# Patient Record
Sex: MALE | Race: BLACK OR AFRICAN AMERICAN | NOT HISPANIC OR LATINO | Employment: OTHER | URBAN - METROPOLITAN AREA
[De-identification: names, ages, dates, MRNs, and addresses within clinical notes are randomized per-mention and may not be internally consistent; named-entity substitution may affect disease eponyms.]

---

## 2018-12-06 ENCOUNTER — HISTORICAL (OUTPATIENT)
Dept: ADMINISTRATIVE | Facility: HOSPITAL | Age: 78
End: 2018-12-06

## 2018-12-12 LAB
FINAL CULTURE: NORMAL
FINAL CULTURE: NORMAL

## 2022-12-16 ENCOUNTER — LAB REQUISITION (OUTPATIENT)
Dept: LAB | Facility: HOSPITAL | Age: 82
End: 2022-12-16
Payer: MEDICARE

## 2022-12-16 DIAGNOSIS — E03.9 HYPOTHYROIDISM, UNSPECIFIED: ICD-10-CM

## 2022-12-16 DIAGNOSIS — R79.9 ABNORMAL FINDING OF BLOOD CHEMISTRY, UNSPECIFIED: ICD-10-CM

## 2022-12-16 LAB
ALBUMIN SERPL-MCNC: 2.9 G/DL (ref 3.4–4.8)
ALBUMIN/GLOB SERPL: 0.7 RATIO (ref 1.1–2)
ALP SERPL-CCNC: 73 UNIT/L (ref 40–150)
ALT SERPL-CCNC: 30 UNIT/L (ref 0–55)
AST SERPL-CCNC: 29 UNIT/L (ref 5–34)
BASOPHILS # BLD AUTO: 0.03 X10(3)/MCL (ref 0–0.2)
BASOPHILS NFR BLD AUTO: 0.4 %
BILIRUBIN DIRECT+TOT PNL SERPL-MCNC: 0.7 MG/DL
BUN SERPL-MCNC: 48.3 MG/DL (ref 8.4–25.7)
CALCIUM SERPL-MCNC: 10.4 MG/DL (ref 8.8–10)
CHLORIDE SERPL-SCNC: 105 MMOL/L (ref 98–107)
CHOLEST SERPL-MCNC: 156 MG/DL
CHOLEST/HDLC SERPL: 3 {RATIO} (ref 0–5)
CO2 SERPL-SCNC: 29 MMOL/L (ref 23–31)
CREAT SERPL-MCNC: 1.98 MG/DL (ref 0.73–1.18)
DEPRECATED CALCIDIOL+CALCIFEROL SERPL-MC: 38.7 NG/ML (ref 30–80)
EOSINOPHIL # BLD AUTO: 0.06 X10(3)/MCL (ref 0–0.9)
EOSINOPHIL NFR BLD AUTO: 0.8 %
ERYTHROCYTE [DISTWIDTH] IN BLOOD BY AUTOMATED COUNT: 15.8 % (ref 11.6–14.4)
EST. AVERAGE GLUCOSE BLD GHB EST-MCNC: 102.5 MG/DL
GFR SERPLBLD CREATININE-BSD FMLA CKD-EPI: 33 MLS/MIN/1.73/M2
GLOBULIN SER-MCNC: 4.4 GM/DL (ref 2.4–3.5)
GLUCOSE SERPL-MCNC: 124 MG/DL (ref 82–115)
HBA1C MFR BLD: 5.2 %
HCT VFR BLD AUTO: 37.5 % (ref 42–52)
HDLC SERPL-MCNC: 47 MG/DL (ref 35–60)
HGB BLD-MCNC: 12.1 GM/DL (ref 14–18)
IMM GRANULOCYTES # BLD AUTO: 0.03 X10(3)/MCL (ref 0–0.04)
IMM GRANULOCYTES NFR BLD AUTO: 0.4 %
LDLC SERPL CALC-MCNC: 97 MG/DL (ref 50–140)
LYMPHOCYTES # BLD AUTO: 1.43 X10(3)/MCL (ref 0.6–4.6)
LYMPHOCYTES NFR BLD AUTO: 20.2 %
MAGNESIUM SERPL-MCNC: 1.8 MG/DL (ref 1.6–2.6)
MCH RBC QN AUTO: 30.2 PG
MCHC RBC AUTO-ENTMCNC: 32.3 MG/DL (ref 33–36)
MCV RBC AUTO: 93.5 FL (ref 80–94)
MONOCYTES # BLD AUTO: 0.71 X10(3)/MCL (ref 0.1–1.3)
MONOCYTES NFR BLD AUTO: 10 %
NEUTROPHILS # BLD AUTO: 4.82 X10(3)/MCL (ref 2.1–9.2)
NEUTROPHILS NFR BLD AUTO: 68.2 %
NRBC BLD AUTO-RTO: 0 % (ref 0–1)
PLATELET # BLD AUTO: 209 X10(3)/MCL (ref 140–371)
PMV BLD AUTO: 11.3 FL (ref 9.4–12.4)
POTASSIUM SERPL-SCNC: 4.2 MMOL/L (ref 3.5–5.1)
PREALB SERPL-MCNC: 17.1 MG/DL (ref 16–42)
PROT SERPL-MCNC: 7.3 GM/DL (ref 5.8–7.6)
RBC # BLD AUTO: 4.01 X10(6)/MCL (ref 4.7–6.1)
SODIUM SERPL-SCNC: 146 MMOL/L (ref 136–145)
T4 FREE SERPL-MCNC: 1.82 NG/DL (ref 0.7–1.48)
TRIGL SERPL-MCNC: 61 MG/DL (ref 34–140)
TSH SERPL-ACNC: 0.92 UIU/ML (ref 0.35–4.94)
VLDLC SERPL CALC-MCNC: 12 MG/DL
WBC # SPEC AUTO: 7.1 X10(3)/MCL (ref 4.5–11.5)

## 2022-12-16 PROCEDURE — 85025 COMPLETE CBC W/AUTO DIFF WBC: CPT | Performed by: NURSE PRACTITIONER

## 2022-12-16 PROCEDURE — 82306 VITAMIN D 25 HYDROXY: CPT | Performed by: NURSE PRACTITIONER

## 2022-12-16 PROCEDURE — 80053 COMPREHEN METABOLIC PANEL: CPT | Performed by: NURSE PRACTITIONER

## 2022-12-16 PROCEDURE — 84134 ASSAY OF PREALBUMIN: CPT | Performed by: NURSE PRACTITIONER

## 2022-12-16 PROCEDURE — 83735 ASSAY OF MAGNESIUM: CPT | Performed by: NURSE PRACTITIONER

## 2022-12-16 PROCEDURE — 83036 HEMOGLOBIN GLYCOSYLATED A1C: CPT | Performed by: NURSE PRACTITIONER

## 2022-12-16 PROCEDURE — 84439 ASSAY OF FREE THYROXINE: CPT | Performed by: NURSE PRACTITIONER

## 2022-12-16 PROCEDURE — 84443 ASSAY THYROID STIM HORMONE: CPT | Performed by: NURSE PRACTITIONER

## 2022-12-16 PROCEDURE — 80061 LIPID PANEL: CPT | Performed by: NURSE PRACTITIONER

## 2022-12-28 ENCOUNTER — HOSPITAL ENCOUNTER (INPATIENT)
Facility: HOSPITAL | Age: 82
LOS: 8 days | Discharge: HOSPICE | DRG: 871 | End: 2023-01-05
Attending: EMERGENCY MEDICINE | Admitting: INTERNAL MEDICINE
Payer: MEDICARE

## 2022-12-28 DIAGNOSIS — J18.9 PNEUMONIA: ICD-10-CM

## 2022-12-28 DIAGNOSIS — U07.1 PNEUMONIA DUE TO COVID-19 VIRUS: Primary | ICD-10-CM

## 2022-12-28 DIAGNOSIS — N17.9 ACUTE KIDNEY INJURY SUPERIMPOSED ON CHRONIC KIDNEY DISEASE: ICD-10-CM

## 2022-12-28 DIAGNOSIS — J12.82 PNEUMONIA DUE TO COVID-19 VIRUS: Primary | ICD-10-CM

## 2022-12-28 DIAGNOSIS — I21.4 NSTEMI (NON-ST ELEVATED MYOCARDIAL INFARCTION): ICD-10-CM

## 2022-12-28 DIAGNOSIS — E13.65 OTHER SPECIFIED DIABETES MELLITUS WITH HYPERGLYCEMIA, UNSPECIFIED WHETHER LONG TERM INSULIN USE: ICD-10-CM

## 2022-12-28 DIAGNOSIS — R06.00 DYSPNEA: ICD-10-CM

## 2022-12-28 DIAGNOSIS — J96.01 ACUTE HYPOXEMIC RESPIRATORY FAILURE: ICD-10-CM

## 2022-12-28 DIAGNOSIS — R06.02 SOB (SHORTNESS OF BREATH): ICD-10-CM

## 2022-12-28 DIAGNOSIS — N18.9 ACUTE KIDNEY INJURY SUPERIMPOSED ON CHRONIC KIDNEY DISEASE: ICD-10-CM

## 2022-12-28 DIAGNOSIS — A41.9 SEPSIS, DUE TO UNSPECIFIED ORGANISM, UNSPECIFIED WHETHER ACUTE ORGAN DYSFUNCTION PRESENT: ICD-10-CM

## 2022-12-28 DIAGNOSIS — M25.473 ANKLE SWELLING: ICD-10-CM

## 2022-12-28 LAB
ABS NEUT (OLG): 19.53 X10(3)/MCL (ref 2.1–9.2)
ALBUMIN SERPL-MCNC: 2.2 G/DL (ref 3.4–4.8)
ALBUMIN/GLOB SERPL: 0.5 RATIO (ref 1.1–2)
ALP SERPL-CCNC: 134 UNIT/L (ref 40–150)
ALT SERPL-CCNC: 34 UNIT/L (ref 0–55)
AST SERPL-CCNC: 50 UNIT/L (ref 5–34)
BILIRUBIN DIRECT+TOT PNL SERPL-MCNC: 0.5 MG/DL
BNP BLD-MCNC: 213 PG/ML
BUN SERPL-MCNC: 78.3 MG/DL (ref 8.4–25.7)
BURR CELLS (OLG): ABNORMAL
CALCIUM SERPL-MCNC: 9 MG/DL (ref 8.8–10)
CHLORIDE SERPL-SCNC: 101 MMOL/L (ref 98–107)
CO2 SERPL-SCNC: 20 MMOL/L (ref 23–31)
CORRECTED TEMPERATURE (PCO2): 40 MMHG (ref 35–45)
CORRECTED TEMPERATURE (PH): 7.32 (ref 7.35–7.45)
CORRECTED TEMPERATURE (PO2): 66 MMHG (ref 80–100)
CREAT SERPL-MCNC: 2.95 MG/DL (ref 0.73–1.18)
ERYTHROCYTE [DISTWIDTH] IN BLOOD BY AUTOMATED COUNT: 15.9 % (ref 11.6–14.4)
FLUAV AG UPPER RESP QL IA.RAPID: NOT DETECTED
FLUBV AG UPPER RESP QL IA.RAPID: NOT DETECTED
GFR SERPLBLD CREATININE-BSD FMLA CKD-EPI: 21 MLS/MIN/1.73/M2
GLOBULIN SER-MCNC: 4.8 GM/DL (ref 2.4–3.5)
GLUCOSE SERPL-MCNC: 271 MG/DL (ref 82–115)
HCO3 UR-SCNC: 20.6 MMOL/L (ref 22–26)
HCT VFR BLD AUTO: 32.2 % (ref 42–52)
HGB BLD-MCNC: 10.5 G/DL (ref 12–16)
HGB BLD-MCNC: 10.7 GM/DL (ref 14–18)
IMM GRANULOCYTES # BLD AUTO: 0.18 X10(3)/MCL (ref 0–0.04)
IMM GRANULOCYTES NFR BLD AUTO: 0.8 %
INSTRUMENT WBC (OLG): 21 X10(3)/MCL
LACTATE SERPL-SCNC: 1.5 MMOL/L (ref 0.5–2.2)
LYMPHOCYTES NFR BLD MANUAL: 0.63 X10(3)/MCL
LYMPHOCYTES NFR BLD MANUAL: 3 %
MCH RBC QN AUTO: 30 PG
MCHC RBC AUTO-ENTMCNC: 33.2 MG/DL (ref 33–36)
MCV RBC AUTO: 90.2 FL (ref 80–94)
MONOCYTES NFR BLD MANUAL: 0.84 X10(3)/MCL (ref 0.1–1.3)
MONOCYTES NFR BLD MANUAL: 4 %
NEUTROPHILS NFR BLD MANUAL: 93 %
NRBC BLD AUTO-RTO: 0 % (ref 0–1)
PCO2 BLDA: 40 MMHG (ref 35–45)
PH SMN: 7.32 [PH] (ref 7.35–7.45)
PLATELET # BLD AUTO: 203 X10(3)/MCL (ref 140–371)
PLATELET # BLD EST: NORMAL 10*3/UL
PMV BLD AUTO: 9.7 FL (ref 9.4–12.4)
PO2 BLDA: 66 MMHG (ref 80–100)
POC BASE DEFICIT: -5.1 MMOL/L (ref -2–2)
POC COHB: 2.5 %
POC IONIZED CALCIUM: 1.15 MMOL/L (ref 1.12–1.23)
POC METHB: 0.6 % (ref 0.4–1.5)
POC O2HB: 92 % (ref 94–97)
POC SATURATED O2: 90.9 %
POC TEMPERATURE: 37 °C
POIKILOCYTOSIS BLD QL SMEAR: ABNORMAL
POTASSIUM BLD-SCNC: 4.3 MMOL/L (ref 3.5–5)
POTASSIUM SERPL-SCNC: 4.6 MMOL/L (ref 3.5–5.1)
PROT SERPL-MCNC: 7 GM/DL (ref 5.8–7.6)
RBC # BLD AUTO: 3.57 X10(6)/MCL (ref 4.7–6.1)
RBC MORPH BLD: ABNORMAL
SARS-COV-2 RNA RESP QL NAA+PROBE: DETECTED
SODIUM BLD-SCNC: 129 MMOL/L (ref 137–145)
SODIUM SERPL-SCNC: 133 MMOL/L (ref 136–145)
SPECIMEN SOURCE: ABNORMAL
TROPONIN I SERPL-MCNC: 0.07 NG/ML (ref 0–0.04)
WBC # SPEC AUTO: 21.3 X10(3)/MCL (ref 4.5–11.5)

## 2022-12-28 PROCEDURE — 85025 COMPLETE CBC W/AUTO DIFF WBC: CPT | Performed by: EMERGENCY MEDICINE

## 2022-12-28 PROCEDURE — 0240U COVID/FLU A&B PCR: CPT | Performed by: EMERGENCY MEDICINE

## 2022-12-28 PROCEDURE — 87040 BLOOD CULTURE FOR BACTERIA: CPT | Performed by: EMERGENCY MEDICINE

## 2022-12-28 PROCEDURE — 27000207 HC ISOLATION

## 2022-12-28 PROCEDURE — 84484 ASSAY OF TROPONIN QUANT: CPT | Performed by: EMERGENCY MEDICINE

## 2022-12-28 PROCEDURE — 85027 COMPLETE CBC AUTOMATED: CPT | Performed by: EMERGENCY MEDICINE

## 2022-12-28 PROCEDURE — 96375 TX/PRO/DX INJ NEW DRUG ADDON: CPT

## 2022-12-28 PROCEDURE — 96366 THER/PROPH/DIAG IV INF ADDON: CPT

## 2022-12-28 PROCEDURE — 11000001 HC ACUTE MED/SURG PRIVATE ROOM

## 2022-12-28 PROCEDURE — 96367 TX/PROPH/DG ADDL SEQ IV INF: CPT

## 2022-12-28 PROCEDURE — 82803 BLOOD GASES ANY COMBINATION: CPT

## 2022-12-28 PROCEDURE — 80053 COMPREHEN METABOLIC PANEL: CPT | Performed by: EMERGENCY MEDICINE

## 2022-12-28 PROCEDURE — 93005 ELECTROCARDIOGRAM TRACING: CPT

## 2022-12-28 PROCEDURE — 36600 WITHDRAWAL OF ARTERIAL BLOOD: CPT

## 2022-12-28 PROCEDURE — 99900035 HC TECH TIME PER 15 MIN (STAT)

## 2022-12-28 PROCEDURE — 83880 ASSAY OF NATRIURETIC PEPTIDE: CPT | Performed by: EMERGENCY MEDICINE

## 2022-12-28 PROCEDURE — 82962 GLUCOSE BLOOD TEST: CPT

## 2022-12-28 PROCEDURE — 83605 ASSAY OF LACTIC ACID: CPT | Performed by: EMERGENCY MEDICINE

## 2022-12-28 PROCEDURE — 93010 EKG 12-LEAD: ICD-10-PCS | Mod: ,,, | Performed by: INTERNAL MEDICINE

## 2022-12-28 PROCEDURE — 63600175 PHARM REV CODE 636 W HCPCS: Performed by: EMERGENCY MEDICINE

## 2022-12-28 PROCEDURE — 96365 THER/PROPH/DIAG IV INF INIT: CPT

## 2022-12-28 PROCEDURE — 93010 ELECTROCARDIOGRAM REPORT: CPT | Mod: ,,, | Performed by: INTERNAL MEDICINE

## 2022-12-28 PROCEDURE — 25000003 PHARM REV CODE 250: Performed by: EMERGENCY MEDICINE

## 2022-12-28 PROCEDURE — 99291 CRITICAL CARE FIRST HOUR: CPT | Mod: 25

## 2022-12-28 RX ORDER — MIRTAZAPINE 15 MG/1
TABLET, FILM COATED ORAL
Status: ON HOLD | COMMUNITY
Start: 2022-02-07 | End: 2023-01-05 | Stop reason: HOSPADM

## 2022-12-28 RX ORDER — INSULIN ASPART 100 [IU]/ML
1-10 INJECTION, SOLUTION INTRAVENOUS; SUBCUTANEOUS
Status: DISCONTINUED | OUTPATIENT
Start: 2022-12-29 | End: 2023-01-05 | Stop reason: HOSPADM

## 2022-12-28 RX ORDER — QUETIAPINE FUMARATE 25 MG/1
25 TABLET, FILM COATED ORAL DAILY
Status: DISCONTINUED | OUTPATIENT
Start: 2022-12-29 | End: 2022-12-30

## 2022-12-28 RX ORDER — TALC
6 POWDER (GRAM) TOPICAL NIGHTLY PRN
Status: DISCONTINUED | OUTPATIENT
Start: 2022-12-29 | End: 2023-01-05 | Stop reason: HOSPADM

## 2022-12-28 RX ORDER — DEXAMETHASONE SODIUM PHOSPHATE 4 MG/ML
8 INJECTION, SOLUTION INTRA-ARTICULAR; INTRALESIONAL; INTRAMUSCULAR; INTRAVENOUS; SOFT TISSUE
Status: COMPLETED | OUTPATIENT
Start: 2022-12-28 | End: 2022-12-28

## 2022-12-28 RX ORDER — TAMSULOSIN HYDROCHLORIDE 0.4 MG/1
0.4 CAPSULE ORAL DAILY
Status: ON HOLD | COMMUNITY
Start: 2022-02-07 | End: 2023-01-05 | Stop reason: HOSPADM

## 2022-12-28 RX ORDER — ZINC SULFATE 50(220)MG
220 CAPSULE ORAL DAILY
Status: ON HOLD | COMMUNITY
End: 2023-01-05 | Stop reason: HOSPADM

## 2022-12-28 RX ORDER — FINASTERIDE 5 MG/1
TABLET, FILM COATED ORAL DAILY
Status: ON HOLD | COMMUNITY
Start: 2022-02-07 | End: 2023-01-05 | Stop reason: HOSPADM

## 2022-12-28 RX ORDER — BENAZEPRIL HYDROCHLORIDE 10 MG/1
10 TABLET ORAL DAILY
Status: ON HOLD | COMMUNITY
Start: 2022-02-09 | End: 2023-01-05 | Stop reason: HOSPADM

## 2022-12-28 RX ORDER — POTASSIUM CHLORIDE 750 MG/1
20 CAPSULE, EXTENDED RELEASE ORAL ONCE
Status: ON HOLD | COMMUNITY
End: 2023-01-05 | Stop reason: HOSPADM

## 2022-12-28 RX ORDER — PIOGLITAZONEHYDROCHLORIDE 15 MG/1
15 TABLET ORAL DAILY
Status: ON HOLD | COMMUNITY
Start: 2022-12-27 | End: 2023-01-05 | Stop reason: HOSPADM

## 2022-12-28 RX ORDER — SODIUM CHLORIDE 0.9 % (FLUSH) 0.9 %
10 SYRINGE (ML) INJECTION
Status: DISCONTINUED | OUTPATIENT
Start: 2022-12-29 | End: 2023-01-05 | Stop reason: HOSPADM

## 2022-12-28 RX ORDER — FUROSEMIDE 20 MG/1
20 TABLET ORAL DAILY
Status: DISCONTINUED | OUTPATIENT
Start: 2022-12-29 | End: 2022-12-29

## 2022-12-28 RX ORDER — ENOXAPARIN SODIUM 100 MG/ML
30 INJECTION SUBCUTANEOUS EVERY 24 HOURS
Status: DISCONTINUED | OUTPATIENT
Start: 2022-12-29 | End: 2022-12-31

## 2022-12-28 RX ORDER — ASCORBIC ACID 500 MG
500 TABLET ORAL 2 TIMES DAILY
Status: ON HOLD | COMMUNITY
End: 2023-01-05 | Stop reason: HOSPADM

## 2022-12-28 RX ORDER — MIRTAZAPINE 15 MG/1
15 TABLET, FILM COATED ORAL NIGHTLY
Status: DISCONTINUED | OUTPATIENT
Start: 2022-12-29 | End: 2023-01-04

## 2022-12-28 RX ORDER — TAMSULOSIN HYDROCHLORIDE 0.4 MG/1
0.4 CAPSULE ORAL DAILY
Status: DISCONTINUED | OUTPATIENT
Start: 2022-12-29 | End: 2023-01-05 | Stop reason: HOSPADM

## 2022-12-28 RX ORDER — QUETIAPINE FUMARATE 25 MG/1
25 TABLET, FILM COATED ORAL
Status: ON HOLD | COMMUNITY
Start: 2022-12-14 | End: 2023-01-05 | Stop reason: HOSPADM

## 2022-12-28 RX ORDER — VANCOMYCIN HCL IN 5 % DEXTROSE 1G/250ML
25 PLASTIC BAG, INJECTION (ML) INTRAVENOUS
Status: COMPLETED | OUTPATIENT
Start: 2022-12-28 | End: 2022-12-28

## 2022-12-28 RX ORDER — IBUPROFEN 200 MG
24 TABLET ORAL
Status: DISCONTINUED | OUTPATIENT
Start: 2022-12-29 | End: 2023-01-05 | Stop reason: HOSPADM

## 2022-12-28 RX ORDER — AMLODIPINE BESYLATE 2.5 MG/1
2.5 TABLET ORAL DAILY
Status: ON HOLD | COMMUNITY
Start: 2022-08-15 | End: 2023-01-05 | Stop reason: HOSPADM

## 2022-12-28 RX ORDER — INSULIN ASPART 100 [IU]/ML
0-5 INJECTION, SOLUTION INTRAVENOUS; SUBCUTANEOUS
Status: DISCONTINUED | OUTPATIENT
Start: 2022-12-29 | End: 2023-01-05 | Stop reason: HOSPADM

## 2022-12-28 RX ORDER — CHOLECALCIFEROL (VITAMIN D3) 25 MCG
5000 TABLET ORAL DAILY
Status: DISCONTINUED | OUTPATIENT
Start: 2022-12-29 | End: 2023-01-05 | Stop reason: HOSPADM

## 2022-12-28 RX ORDER — DEXAMETHASONE 6 MG/1
6 TABLET ORAL NIGHTLY
Status: ON HOLD | COMMUNITY
Start: 2022-12-26 | End: 2023-01-05 | Stop reason: HOSPADM

## 2022-12-28 RX ORDER — FINASTERIDE 5 MG/1
5 TABLET, FILM COATED ORAL DAILY
Status: DISCONTINUED | OUTPATIENT
Start: 2022-12-29 | End: 2023-01-05 | Stop reason: HOSPADM

## 2022-12-28 RX ORDER — ZINC SULFATE 50(220)MG
220 CAPSULE ORAL DAILY
Status: DISPENSED | OUTPATIENT
Start: 2022-12-29 | End: 2023-01-04

## 2022-12-28 RX ORDER — ASCORBIC ACID 250 MG
500 TABLET ORAL 2 TIMES DAILY
Status: DISCONTINUED | OUTPATIENT
Start: 2022-12-29 | End: 2023-01-05 | Stop reason: HOSPADM

## 2022-12-28 RX ORDER — FUROSEMIDE 40 MG/1
TABLET ORAL
Status: ON HOLD | COMMUNITY
Start: 2022-02-07 | End: 2023-01-05 | Stop reason: HOSPADM

## 2022-12-28 RX ORDER — CHOLECALCIFEROL (VITAMIN D3) 25 MCG
5000 TABLET ORAL DAILY
Status: ON HOLD | COMMUNITY
End: 2023-01-05 | Stop reason: HOSPADM

## 2022-12-28 RX ORDER — GLUCAGON 1 MG
1 KIT INJECTION
Status: DISCONTINUED | OUTPATIENT
Start: 2022-12-29 | End: 2023-01-05 | Stop reason: HOSPADM

## 2022-12-28 RX ORDER — IBUPROFEN 200 MG
16 TABLET ORAL
Status: DISCONTINUED | OUTPATIENT
Start: 2022-12-29 | End: 2023-01-05 | Stop reason: HOSPADM

## 2022-12-28 RX ADMIN — VANCOMYCIN HYDROCHLORIDE 2000 MG: 1 INJECTION, POWDER, LYOPHILIZED, FOR SOLUTION INTRAVENOUS at 01:12

## 2022-12-28 RX ADMIN — PIPERACILLIN AND TAZOBACTAM 4.5 G: 4; .5 INJECTION, POWDER, FOR SOLUTION INTRAVENOUS; PARENTERAL at 11:12

## 2022-12-28 RX ADMIN — DEXAMETHASONE SODIUM PHOSPHATE 8 MG: 4 INJECTION, SOLUTION INTRA-ARTICULAR; INTRALESIONAL; INTRAMUSCULAR; INTRAVENOUS; SOFT TISSUE at 02:12

## 2022-12-28 RX ADMIN — PIPERACILLIN AND TAZOBACTAM 4.5 G: 4; .5 INJECTION, POWDER, LYOPHILIZED, FOR SOLUTION INTRAVENOUS; PARENTERAL at 12:12

## 2022-12-28 RX ADMIN — SODIUM CHLORIDE, POTASSIUM CHLORIDE, SODIUM LACTATE AND CALCIUM CHLORIDE 1000 ML: 600; 310; 30; 20 INJECTION, SOLUTION INTRAVENOUS at 12:12

## 2022-12-28 RX ADMIN — SODIUM CHLORIDE, POTASSIUM CHLORIDE, SODIUM LACTATE AND CALCIUM CHLORIDE 2000 ML: 600; 310; 30; 20 INJECTION, SOLUTION INTRAVENOUS at 02:12

## 2022-12-28 NOTE — ED PROVIDER NOTES
Encounter Date: 12/28/2022    SCRIBE #1 NOTE: I, Priscilla Giron, am scribing for, and in the presence of,  Marya Padilla MD. I have scribed the following portions of the note - Other sections scribed: HPI, ROS, PE.     History     Chief Complaint   Patient presents with    Shortness of Breath     Pt to ER via [facility] for SOB.  Pt resident of \[facility]\"".  Recently Covid+ and pneumonia, noted to have low BP and sats today.  Sent for eval     82 year old male w/ hx of CKD, DM, HTN, and HLD presents to ED via EMS from [facility] in [place] for SOB. EMS reports pt was covid positive yesterday with pneumonia, noted to have low blood pressure and O2 sats in mid 80s today and sent here for evaluation.    The history is provided by the EMS personnel. The history is limited by the condition of the patient. No  was used.   Shortness of Breath  This is a new problem. The problem occurs continuously.The current episode started 6 to 12 hours ago. The problem has not changed since onset.Associated symptoms include cough and sputum production. Pertinent negatives include no fever and no vomiting.   Review of patient's allergies indicates:  No Known Allergies  Past Medical History:   Diagnosis Date    BPH (benign prostatic hyperplasia)     Cognitive communication deficit      History reviewed. No pertinent surgical history.  History reviewed. No pertinent family history.     Review of Systems   Unable to perform ROS: Severe respiratory distress   Constitutional:  Negative for fever.   Respiratory:  Positive for cough, sputum production and shortness of breath.    Gastrointestinal:  Negative for vomiting.     Physical Exam     Initial Vitals [12/28/22 1149]   BP Pulse Resp Temp SpO2   (!) 96/56 69 (!) 26 (!) 95.4 °F (35.2 °C) 95 %      MAP       --         Physical Exam    Nursing note and vitals reviewed.  Constitutional: He appears well-developed and well-nourished. He appears distressed.   Pt is  somnolent but rouses.   HENT:   Head: Normocephalic and atraumatic.   Oropharynx dry   Eyes: Conjunctivae are normal. Pupils are equal, round, and reactive to light.   Neck: Neck supple.   Normal range of motion.  Cardiovascular:  Normal rate, regular rhythm and normal heart sounds.           No murmur heard.  Pulmonary/Chest:   Course breath sounds. Labored breathing.   Abdominal: Abdomen is soft. He exhibits no distension. There is no abdominal tenderness.   Musculoskeletal:         General: Normal range of motion.      Cervical back: Normal range of motion and neck supple.     Neurological: GCS eye subscore is 4. GCS verbal subscore is 5. GCS motor subscore is 6.   Follows commands.  GCS 4,4,6 (difficult to understand speech, previously documented cognitive communication deficit in nursing home paperwork)   Skin: Skin is dry. No rash noted.   Psychiatric: He has a normal mood and affect.       ED Course   Critical Care    Date/Time: 12/28/2022 12:09 PM  Performed by: Marya Padilla MD  Authorized by: Marya Padilla MD   Direct patient critical care time: 42 minutes  Additional history critical care time: 4 minutes  Ordering / reviewing critical care time: 6 minutes  Documentation critical care time: 5 minutes  Consulting other physicians critical care time: 4 minutes  Total critical care time (exclusive of procedural time) : 61 minutes  Critical care time was exclusive of separately billable procedures and treating other patients.  Critical care was necessary to treat or prevent imminent or life-threatening deterioration of the following conditions: circulatory failure, respiratory failure and sepsis.  Critical care was time spent personally by me on the following activities: blood draw for specimens, development of treatment plan with patient or surrogate, discussions with consultants, interpretation of cardiac output measurements, evaluation of patient's response to treatment, examination of patient,  obtaining history from patient or surrogate, ordering and performing treatments and interventions, ordering and review of radiographic studies, ordering and review of laboratory studies, pulse oximetry and re-evaluation of patient's condition.      Labs Reviewed   COMPREHENSIVE METABOLIC PANEL - Abnormal; Notable for the following components:       Result Value    Sodium Level 133 (*)     Carbon Dioxide 20 (*)     Glucose Level 271 (*)     Blood Urea Nitrogen 78.3 (*)     Creatinine 2.95 (*)     Albumin Level 2.2 (*)     Globulin 4.8 (*)     Albumin/Globulin Ratio 0.5 (*)     Aspartate Aminotransferase 50 (*)     All other components within normal limits   TROPONIN I - Abnormal; Notable for the following components:    Troponin-I 0.068 (*)     All other components within normal limits   B-TYPE NATRIURETIC PEPTIDE - Abnormal; Notable for the following components:    Natriuretic Peptide 213.0 (*)     All other components within normal limits   CBC WITH DIFFERENTIAL - Abnormal; Notable for the following components:    WBC 21.3 (*)     RBC 3.57 (*)     Hgb 10.7 (*)     Hct 32.2 (*)     RDW 15.9 (*)     IG# 0.18 (*)     All other components within normal limits   MANUAL DIFFERENTIAL - Abnormal; Notable for the following components:    Abs Neut 19.53 (*)     RBC Morph Abnormal (*)     Poik 2+ (*)     Lynne Cells 2+ (*)     All other components within normal limits   LACTIC ACID, PLASMA - Normal   BLOOD CULTURE OLG   BLOOD CULTURE OLG   CBC W/ AUTO DIFFERENTIAL    Narrative:     The following orders were created for panel order CBC auto differential.  Procedure                               Abnormality         Status                     ---------                               -----------         ------                     CBC with Differential[[id number]]        Abnormal            Final result               Manual Differential[[id number]]          Abnormal            Final result                 Please view results for these  tests on the individual orders.     EKG Readings: (Independently Interpreted)   Initial Reading: No STEMI. Rhythm: Normal Sinus Rhythm. Heart Rate: 63. Ectopy: PVCs. Axis: Normal. Clinical Impression: Normal Sinus Rhythm with PVCs   12/28/2022 @ 1154     Imaging Results              X-Ray Chest 1 View (Final result)  Result time 12/28/22 13:21:09      Final result by Cm Roberson MD (12/28/22 13:21:09)                   Impression:      Prominent interstitial markings with no focal opacification.  No prior imaging available for comparison.  Developing infectious process is not entirely excluded.      Electronically signed by: Cm Roberson  Date:    12/28/2022  Time:    13:21               Narrative:    EXAMINATION:  XR CHEST 1 VIEW    CLINICAL HISTORY:  Pneumonia, unspecified organism    TECHNIQUE:  Single view of the chest    COMPARISON:  No prior imaging available for comparison.    FINDINGS:  Prominent interstitial markings with no focal opacification.    The cardiomediastinal silhouette is within normal limits.    No acute osseous abnormality.                                       Medications   lactated ringers bolus 1,000 mL (0 mLs Intravenous Stopped 12/28/22 1330)   piperacillin-tazobactam (ZOSYN) 4.5 g in dextrose 5 % in water (D5W) 5 % 100 mL IVPB (MB+) (0 g Intravenous Stopped 12/28/22 1300)   vancomycin in dextrose 5 % 1 gram/250 mL IVPB 2,000 mg (0 mg Intravenous Stopped 12/28/22 1500)   lactated ringers bolus 2,000 mL (0 mLs Intravenous Stopped 12/28/22 1515)   dexAMETHasone injection 8 mg (8 mg Intravenous Given 12/28/22 1445)     Medical Decision Making:   Initial Assessment:   Mr. Lorenzo presented for evaluation of worsening shortness of breath, hypotension and hypoxia the setting of COVID-19 infection diagnosed yesterday along with reported pneumonia, currently receiving IV antibiotics at his nursing home.  SpO2 improved on 3 L nasal cannula and tenuous blood pressure noted on ED arrival.   "Fluid resuscitation and IV antibiotics initiated along with workup for probable sepsis related to lower respiratory infection.  Differential Diagnosis:   Reportedly COVID-19 positive, possibly with superimposed bacterial pneumonia given how ill he appears at this time.  Oropharynx dry, consider dehydration, electrolyte derangements, acute kidney injury in the setting of sepsis  Independently Interpreted Test(s):   I have ordered and independently interpreted EKG Reading(s) - see prior notes  Clinical Tests:   Lab Tests: Ordered and Reviewed  Radiological Study: Ordered and Reviewed  Medical Tests: Ordered and Reviewed  Sepsis Perfusion Assessment: "I attest a sepsis perfusion exam was performed within 6 hours of sepsis, severe sepsis, or septic shock presentation, following fluid resuscitation."    Sepsis Perfusion Assessment Complete: 12/28/2022 3:30 PM    ED Management:  Chest x-ray consistent with pneumonia, leukocytosis noted as well.  Laboratory evaluation otherwise notable for acute kidney injury superimposed on chronic kidney disease COVID-19 positive.  ABG with hypoxemia, mildly elevated troponin likely reflective of demand ischemia, mildly elevated BNP.  As below, patient noted to acutely become hypotensive around the time that he received IV Zosyn.  Blood pressure responsive to fluid boluses though did remain tenuous throughout emergency department observation. .  He will be admitted to the hospital medicine service for further inpatient treatment of acute COVID-19 pneumonitis/pneumonia with possible superimposed bacterial infection causing sepsis with associated acute kidney injury.  Patient noted Full Code advance directive on accompanying paperwork from the nursing home.        Scribe Attestation:   Scribe #1: I performed the above scribed service and the documentation accurately describes the services I performed. I attest to the accuracy of the note.    Attending Attestation:           Physician " Attestation for Scribe:  Physician Attestation Statement for Scribe #1: I, Marya Padilla MD, reviewed documentation, as scribed by Priscilla Giron in my presence, and it is both accurate and complete.           ED Course as of 12/29/22 0526   Wed Dec 28, 2022   1401 Noted acutely hypotensive, will give additional 2 liter fluid bolus which will exceed his 30 cc/kg sepsis bolus requirements [KS]      ED Course User Index  [KS] Marya Padilla MD                 Clinical Impression:   Final diagnoses:  [J18.9] Pneumonia  [U07.1, J12.82] Pneumonia due to COVID-19 virus (Primary)  [J96.01] Acute hypoxemic respiratory failure  [A41.9] Sepsis, due to unspecified organism, unspecified whether acute organ dysfunction present  [N17.9, N18.9] Acute kidney injury superimposed on chronic kidney disease  [E13.65] Other specified diabetes mellitus with hyperglycemia, unspecified whether long term insulin use        ED Disposition Condition    Admit Stable                Marya Padilla MD  12/29/22 0528

## 2022-12-29 LAB
ALBUMIN SERPL-MCNC: 2 G/DL (ref 3.4–4.8)
ALBUMIN/GLOB SERPL: 0.5 RATIO (ref 1.1–2)
ALP SERPL-CCNC: 107 UNIT/L (ref 40–150)
ALT SERPL-CCNC: 42 UNIT/L (ref 0–55)
APPEARANCE UR: ABNORMAL
AST SERPL-CCNC: 69 UNIT/L (ref 5–34)
BACTERIA #/AREA URNS AUTO: ABNORMAL /HPF
BASOPHILS # BLD AUTO: 0.09 X10(3)/MCL (ref 0–0.2)
BASOPHILS NFR BLD AUTO: 0.4 %
BILIRUB UR QL STRIP.AUTO: NEGATIVE MG/DL
BILIRUBIN DIRECT+TOT PNL SERPL-MCNC: 0.4 MG/DL
BUN SERPL-MCNC: 73 MG/DL (ref 8.4–25.7)
CALCIUM SERPL-MCNC: 7.9 MG/DL (ref 8.8–10)
CHLORIDE SERPL-SCNC: 105 MMOL/L (ref 98–107)
CO2 SERPL-SCNC: 18 MMOL/L (ref 23–31)
COLOR UR AUTO: YELLOW
CREAT SERPL-MCNC: 2.37 MG/DL (ref 0.73–1.18)
EOSINOPHIL # BLD AUTO: 0 X10(3)/MCL (ref 0–0.9)
EOSINOPHIL NFR BLD AUTO: 0 %
ERYTHROCYTE [DISTWIDTH] IN BLOOD BY AUTOMATED COUNT: 15.9 % (ref 11.6–14.4)
GFR SERPLBLD CREATININE-BSD FMLA CKD-EPI: 27 MLS/MIN/1.73/M2
GLOBULIN SER-MCNC: 3.7 GM/DL (ref 2.4–3.5)
GLUCOSE SERPL-MCNC: 173 MG/DL (ref 82–115)
GLUCOSE UR QL STRIP.AUTO: NEGATIVE MG/DL
HCT VFR BLD AUTO: 29.9 % (ref 42–52)
HGB BLD-MCNC: 10 GM/DL (ref 14–18)
IMM GRANULOCYTES # BLD AUTO: 0.17 X10(3)/MCL (ref 0–0.04)
IMM GRANULOCYTES NFR BLD AUTO: 0.8 %
KETONES UR QL STRIP.AUTO: NEGATIVE MG/DL
LEUKOCYTE ESTERASE UR QL STRIP.AUTO: ABNORMAL UNIT/L
LYMPHOCYTES # BLD AUTO: 0.69 X10(3)/MCL (ref 0.6–4.6)
LYMPHOCYTES NFR BLD AUTO: 3.3 %
MCH RBC QN AUTO: 30.2 PG
MCHC RBC AUTO-ENTMCNC: 33.4 MG/DL (ref 33–36)
MCV RBC AUTO: 90.3 FL (ref 80–94)
MONOCYTES # BLD AUTO: 0.39 X10(3)/MCL (ref 0.1–1.3)
MONOCYTES NFR BLD AUTO: 1.9 %
NEUTROPHILS # BLD AUTO: 19.26 X10(3)/MCL (ref 2.1–9.2)
NEUTROPHILS NFR BLD AUTO: 93.6 %
NITRITE UR QL STRIP.AUTO: NEGATIVE
NRBC BLD AUTO-RTO: 0 % (ref 0–1)
PH UR STRIP.AUTO: 7.5 [PH]
PLATELET # BLD AUTO: 190 X10(3)/MCL (ref 140–371)
PMV BLD AUTO: 10.1 FL (ref 9.4–12.4)
POCT GLUCOSE: 147 MG/DL (ref 70–110)
POCT GLUCOSE: 190 MG/DL (ref 70–110)
POTASSIUM SERPL-SCNC: 4.7 MMOL/L (ref 3.5–5.1)
PROT SERPL-MCNC: 5.7 GM/DL (ref 5.8–7.6)
PROT UR QL STRIP.AUTO: ABNORMAL MG/DL
RBC # BLD AUTO: 3.31 X10(6)/MCL (ref 4.7–6.1)
RBC #/AREA URNS AUTO: 11 /HPF
RBC UR QL AUTO: ABNORMAL UNIT/L
SODIUM SERPL-SCNC: 134 MMOL/L (ref 136–145)
SP GR UR STRIP.AUTO: 1.01 (ref 1–1.03)
SQUAMOUS #/AREA URNS AUTO: <5 /HPF
TROPONIN I SERPL-MCNC: 0.04 NG/ML (ref 0–0.04)
TROPONIN I SERPL-MCNC: 0.05 NG/ML (ref 0–0.04)
TROPONIN I SERPL-MCNC: 0.12 NG/ML (ref 0–0.04)
UROBILINOGEN UR STRIP-ACNC: 0.2 MG/DL
VANCOMYCIN TROUGH SERPL-MCNC: 14.1 UG/ML (ref 15–20)
WBC # SPEC AUTO: 20.6 X10(3)/MCL (ref 4.5–11.5)
WBC #/AREA URNS AUTO: 304 /HPF

## 2022-12-29 PROCEDURE — 25000003 PHARM REV CODE 250: Performed by: INTERNAL MEDICINE

## 2022-12-29 PROCEDURE — 80053 COMPREHEN METABOLIC PANEL: CPT | Performed by: INTERNAL MEDICINE

## 2022-12-29 PROCEDURE — 25000242 PHARM REV CODE 250 ALT 637 W/ HCPCS: Performed by: INTERNAL MEDICINE

## 2022-12-29 PROCEDURE — 87088 URINE BACTERIA CULTURE: CPT | Performed by: INTERNAL MEDICINE

## 2022-12-29 PROCEDURE — 63600175 PHARM REV CODE 636 W HCPCS: Performed by: INTERNAL MEDICINE

## 2022-12-29 PROCEDURE — 11000001 HC ACUTE MED/SURG PRIVATE ROOM

## 2022-12-29 PROCEDURE — 27000207 HC ISOLATION

## 2022-12-29 PROCEDURE — 85025 COMPLETE CBC W/AUTO DIFF WBC: CPT | Performed by: INTERNAL MEDICINE

## 2022-12-29 PROCEDURE — 81001 URINALYSIS AUTO W/SCOPE: CPT | Performed by: INTERNAL MEDICINE

## 2022-12-29 PROCEDURE — 21400001 HC TELEMETRY ROOM

## 2022-12-29 PROCEDURE — 80202 ASSAY OF VANCOMYCIN: CPT | Performed by: INTERNAL MEDICINE

## 2022-12-29 PROCEDURE — 84484 ASSAY OF TROPONIN QUANT: CPT | Performed by: INTERNAL MEDICINE

## 2022-12-29 RX ORDER — ATORVASTATIN CALCIUM 10 MG/1
20 TABLET, FILM COATED ORAL DAILY
Status: DISCONTINUED | OUTPATIENT
Start: 2022-12-30 | End: 2023-01-05 | Stop reason: HOSPADM

## 2022-12-29 RX ORDER — DOXYCYCLINE HYCLATE 100 MG
100 TABLET ORAL EVERY 12 HOURS
Status: DISCONTINUED | OUTPATIENT
Start: 2022-12-29 | End: 2023-01-05 | Stop reason: HOSPADM

## 2022-12-29 RX ORDER — VANCOMYCIN HCL IN 5 % DEXTROSE 1G/250ML
1000 PLASTIC BAG, INJECTION (ML) INTRAVENOUS ONCE
Status: COMPLETED | OUTPATIENT
Start: 2022-12-29 | End: 2022-12-29

## 2022-12-29 RX ORDER — METOPROLOL SUCCINATE 25 MG/1
25 TABLET, EXTENDED RELEASE ORAL DAILY
Status: DISCONTINUED | OUTPATIENT
Start: 2022-12-30 | End: 2023-01-05 | Stop reason: HOSPADM

## 2022-12-29 RX ORDER — ASPIRIN 81 MG/1
81 TABLET ORAL DAILY
Status: DISCONTINUED | OUTPATIENT
Start: 2022-12-30 | End: 2023-01-05 | Stop reason: HOSPADM

## 2022-12-29 RX ADMIN — TAMSULOSIN HYDROCHLORIDE 0.4 MG: 0.4 CAPSULE ORAL at 09:12

## 2022-12-29 RX ADMIN — DEXAMETHASONE 6 MG: 2 TABLET ORAL at 09:12

## 2022-12-29 RX ADMIN — AZITHROMYCIN MONOHYDRATE 500 MG: 500 INJECTION, POWDER, LYOPHILIZED, FOR SOLUTION INTRAVENOUS at 03:12

## 2022-12-29 RX ADMIN — CHOLECALCIFEROL TAB 25 MCG (1000 UNIT) 5000 UNITS: 25 TAB at 09:12

## 2022-12-29 RX ADMIN — QUETIAPINE FUMARATE 25 MG: 25 TABLET ORAL at 09:12

## 2022-12-29 RX ADMIN — FINASTERIDE 5 MG: 5 TABLET, FILM COATED ORAL at 09:12

## 2022-12-29 RX ADMIN — SODIUM BICARBONATE: 84 INJECTION, SOLUTION INTRAVENOUS at 10:12

## 2022-12-29 RX ADMIN — DOXYCYCLINE HYCLATE 100 MG: 100 TABLET, COATED ORAL at 10:12

## 2022-12-29 RX ADMIN — MIRTAZAPINE 15 MG: 15 TABLET, FILM COATED ORAL at 10:12

## 2022-12-29 RX ADMIN — FUROSEMIDE 20 MG: 20 TABLET ORAL at 09:12

## 2022-12-29 RX ADMIN — VANCOMYCIN HYDROCHLORIDE 1000 MG: 1 INJECTION, POWDER, LYOPHILIZED, FOR SOLUTION INTRAVENOUS at 01:12

## 2022-12-29 RX ADMIN — ZINC SULFATE 220 MG (50 MG) CAPSULE 220 MG: CAPSULE at 09:12

## 2022-12-29 RX ADMIN — PIPERACILLIN AND TAZOBACTAM 4.5 G: 4; .5 INJECTION, POWDER, FOR SOLUTION INTRAVENOUS; PARENTERAL at 10:12

## 2022-12-29 RX ADMIN — Medication 500 MG: at 10:12

## 2022-12-29 RX ADMIN — PIPERACILLIN AND TAZOBACTAM 4.5 G: 4; .5 INJECTION, POWDER, FOR SOLUTION INTRAVENOUS; PARENTERAL at 09:12

## 2022-12-29 RX ADMIN — Medication 6 MG: at 10:12

## 2022-12-29 RX ADMIN — Medication 500 MG: at 09:12

## 2022-12-29 NOTE — PROGRESS NOTES
Pharmacokinetic Assessment Follow Up: IV Vancomycin    Vancomycin serum concentration assessment(s):    The random level was drawn correctly and can be used to guide therapy at this time. The measurement is below the desired definitive target range of 15 to 20 mcg/mL.    Vancomycin Regimen Plan:  Continue to pulse dose due to poor renal function  Give 1000 mg IV vancomycin x 1 dose now  Check random level on 12/30 @0500      Drug levels (last 3 results):  Recent Labs   Lab Result Units 12/29/22  1131   Vancomycin Trough ug/ml 14.1*       Vancomycin Administrations:  vancomycin given in the last 96 hours                     vancomycin in dextrose 5 % 1 gram/250 mL IVPB 2,000 mg (mg) 2,000 mg New Bag 12/28/22 1300                    Pharmacy will continue to follow and monitor vancomycin.    Please contact pharmacy at extension [phone] for questions regarding this assessment.    Thank you for the consult,   Paresh Hylton       Patient brief summary:  Caryn Lorenzo is a 82 y.o. male initiated on antimicrobial therapy with IV Vancomycin for treatment of bacteremia      Drug Allergies:   Review of patient's allergies indicates:  No Known Allergies    Actual Body Weight:   82 kg    Renal Function:   CrCl cannot be calculated (Unknown ideal weight.).,     Dialysis Method (if applicable):  N/A    CBC (last 72 hours):  Recent Labs   Lab Result Units 12/26/22  1345 12/28/22  1217 12/29/22  0452   WBC x10(3)/mcL 9.1 21.3* 20.6*   Hgb gm/dL 10.6* 10.7* 10.0*   Hct % 31.7* 32.2* 29.9*   Platelet x10(3)/mcL 203 203 190   Mono % %  --   --  1.9   Monocyte Man % 6 4  --    Eos % %  --   --  0.0   Basophil % %  --   --  0.4       Metabolic Panel (last 72 hours):  Recent Labs   Lab Result Units 12/26/22  1345 12/28/22  1217 12/29/22  0452   Sodium Level mmol/L 131* 133* 134*   Potassium Level mmol/L 4.9 4.6 4.7   Chloride mmol/L 97* 101 105   Carbon Dioxide mmol/L 22* 20* 18*   Glucose Level mg/dL 95 271* 173*   Blood Urea Nitrogen  mg/dL 53.2* 78.3* 73.0*   Creatinine mg/dL 2.07* 2.95* 2.37*   Albumin Level g/dL 2.5* 2.2* 2.0*   Bilirubin Total mg/dL 0.6 0.5 0.4   Alkaline Phosphatase unit/L 110 134 107   Aspartate Aminotransferase unit/L 47* 50* 69*   Alanine Aminotransferase unit/L 41 34 42   Magnesium Level mg/dL 1.70  --   --        Microbiologic Results:  Microbiology Results (last 7 days)       Procedure Component Value Units Date/Time    Blood Culture #1 **CANNOT BE ORDERED STAT** [[id number]] Collected: 12/28/22 1217    Order Status: Resulted Specimen: Blood Updated: 12/28/22 1238    Blood Culture #2 **CANNOT BE ORDERED STAT** [[id number]] Collected: 12/28/22 1226    Order Status: Resulted Specimen: Blood Updated: 12/28/22 1238

## 2022-12-29 NOTE — CONSULTS
[facility] - Emergency Dept  Cardiology  Consult Note    Patient Name: Caryn Lorenzo  MRN: [id number]  Admission Date: 12/28/2022  Hospital Length of Stay: 1 days  Code Status: Full Code   Attending Provider: Jose Mccauley MD   Consulting Provider: Jack Shirley MD  Primary Care Physician: Primary Doctor No  Principal Problem:Pneumonia due to COVID-19 virus    Patient information was obtained from patient, relative(s), and ER records.     Subjective:     Chief Complaint:  shortness of breath     HPI: Patient is an 82 year old female NH patient unknown to [facility] with hx of CKD3, anemia of chronic disease, DM2, HTN, gout who has been admitted to [facility] IM service 2/2 shortness of breath and hypotension, found to be covid positive, hypoxic on 3 liters, minimally elevated troponin.      PMH: see HPI  PSH: denies   Family History: n/a  Social History: lives Penny [place]    Previous Cardiac Diagnostics:   None on file    Past Medical History:   Diagnosis Date    BPH (benign prostatic hyperplasia)     Cognitive communication deficit        History reviewed. No pertinent surgical history.    Review of patient's allergies indicates:  No Known Allergies    No current facility-administered medications on file prior to encounter.     Current Outpatient Medications on File Prior to Encounter   Medication Sig    amLODIPine (NORVASC) 2.5 MG tablet Take 2.5 mg by mouth once daily.    ascorbic acid, vitamin C, (VITAMIN C) 500 MG tablet Take 500 mg by mouth 2 (two) times daily. Stop date: 01/08/23    benazepriL (LOTENSIN) 10 MG tablet Take 10 mg by mouth once daily.    dexAMETHasone (DECADRON) 6 MG tablet Take 6 mg by mouth every evening. Stop date: 01/04/23    finasteride (PROSCAR) 5 mg tablet once daily.    pioglitazone (ACTOS) 15 MG tablet Take 15 mg by mouth once daily.    potassium chloride (MICRO-K) 10 MEQ CpSR Take 20 mEq by mouth once.    tamsulosin (FLOMAX) 0.4 mg Cap Take 0.4 mg by mouth once daily.     vitamin D (VITAMIN D3) 1000 units Tab Take 5,000 Units by mouth once daily.    zinc sulfate (ZINCATE) 50 mg zinc (220 mg) capsule Take 220 mg by mouth once daily.    furosemide (LASIX) 40 MG tablet     mirtazapine (REMERON) 15 MG tablet     QUEtiapine (SEROQUEL) 25 MG Tab Take 25 mg by mouth.     Family History    None       Tobacco Use    Smoking status: Not on file    Smokeless tobacco: Not on file   Substance and Sexual Activity    Alcohol use: Not on file    Drug use: Not on file    Sexual activity: Not on file       Review of Systems   Constitutional:  Positive for fatigue. Negative for chills and fever.   HENT:  Positive for congestion and sore throat. Negative for trouble swallowing and voice change.    Eyes:  Negative for visual disturbance.   Respiratory:  Positive for cough, shortness of breath and wheezing.    Cardiovascular:  Negative for chest pain, palpitations and leg swelling.   Gastrointestinal:  Negative for abdominal pain, nausea and vomiting.   Genitourinary:  Negative for dysuria.   Musculoskeletal: Negative.    Skin: Negative.    Neurological: Negative.      Objective:     Vital Signs (Most Recent):  Temp: 97.4 °F (36.3 °C) (12/28/22 1748)  Pulse: 66 (12/29/22 0718)  Resp: (!) 25 (12/29/22 0718)  BP: 102/61 (12/29/22 0658)  SpO2: 97 % (12/29/22 0718)   Vital Signs (24h Range):  Temp:  [93.7 °F (34.3 °C)-97.4 °F (36.3 °C)] 97.4 °F (36.3 °C)  Pulse:  [55-90] 66  Resp:  [18-35] 25  SpO2:  [92 %-98 %] 97 %  BP: ()/(37-84) 102/61     Weight: 82.1 kg (181 lb)  There is no height or weight on file to calculate BMI.    SpO2: 97 %         Intake/Output Summary (Last 24 hours) at 12/29/2022 0702  Last data filed at 12/29/2022 0443  Gross per 24 hour   Intake 3599 ml   Output 800 ml   Net 2799 ml       Lines/Drains/Airways       Peripheral Intravenous Line  Duration                  Peripheral IV - Single Lumen 24 G Anterior;Distal;Left Forearm -- days         Peripheral IV - Single Lumen 12/28/22  1206 20 G Right Forearm <1 day                    Significant Labs:  Recent Results (from the past 72 hour(s))   Comprehensive Metabolic Panel    Collection Time: 12/26/22  1:45 PM   Result Value Ref Range    Sodium Level 131 (L) 136 - 145 mmol/L    Potassium Level 4.9 3.5 - 5.1 mmol/L    Chloride 97 (L) 98 - 107 mmol/L    Carbon Dioxide 22 (L) 23 - 31 mmol/L    Glucose Level 95 82 - 115 mg/dL    Blood Urea Nitrogen 53.2 (H) 8.4 - 25.7 mg/dL    Creatinine 2.07 (H) 0.73 - 1.18 mg/dL    Calcium Level Total 8.5 (L) 8.8 - 10.0 mg/dL    Protein Total 6.5 5.8 - 7.6 gm/dL    Albumin Level 2.5 (L) 3.4 - 4.8 g/dL    Globulin 4.0 (H) 2.4 - 3.5 gm/dL    Albumin/Globulin Ratio 0.6 (L) 1.1 - 2.0 ratio    Bilirubin Total 0.6 <=1.5 mg/dL    Alkaline Phosphatase 110 40 - 150 unit/L    Alanine Aminotransferase 41 0 - 55 unit/L    Aspartate Aminotransferase 47 (H) 5 - 34 unit/L    eGFR 32 mls/min/1.73/m2   Magnesium    Collection Time: 12/26/22  1:45 PM   Result Value Ref Range    Magnesium Level 1.70 1.60 - 2.60 mg/dL   CRP, High Sensitivity    Collection Time: 12/26/22  1:45 PM   Result Value Ref Range    C-Reactive Protein High Sensitivity >160.00 (H) <=5.00 mg/L   Sedimentation Rate    Collection Time: 12/26/22  1:45 PM   Result Value Ref Range    Sed Rate 105 (H) 0 - 15 mm/hr   CBC with Differential    Collection Time: 12/26/22  1:45 PM   Result Value Ref Range    WBC 9.1 4.5 - 11.5 x10(3)/mcL    RBC 3.48 (L) 4.70 - 6.10 x10(6)/mcL    Hgb 10.6 (L) 14.0 - 18.0 gm/dL    Hct 31.7 (L) 42.0 - 52.0 %    MCV 91.1 80.0 - 94.0 fL    MCH 30.5 pg    MCHC 33.4 33.0 - 36.0 mg/dL    RDW 15.9 (H) 11.6 - 14.4 %    Platelet 203 140 - 371 x10(3)/mcL    MPV 10.3 9.4 - 12.4 fL    IG# 0.13 (H) 0 - 0.04 x10(3)/mcL    IG% 1.4 %    NRBC% 0.0 0 - 1 %   Manual Differential    Collection Time: 12/26/22  1:45 PM   Result Value Ref Range    Neut Man 75 47 - 80 %    Band Neutrophil Man 16 (H) 0 - 11 %    Lymph Man 3 (L) 13 - 40 %    Monocyte Man 6 2 - 11 %     Abs Neut calc 8.281 2.1 - 9.2 x10(3)/mcL    Abs Mono 0.546 0.1 - 1.3 x10(3)/mcL    Abs Lymp 0.273 (L) 0.6 - 4.6 x10(3)/mcL    RBC Morph Abnormal (A) Normal    Hypochrom Slight (A) (none)    Platelet Est Adequate Normal, Adequate   Comprehensive metabolic panel    Collection Time: 12/28/22 12:17 PM   Result Value Ref Range    Sodium Level 133 (L) 136 - 145 mmol/L    Potassium Level 4.6 3.5 - 5.1 mmol/L    Chloride 101 98 - 107 mmol/L    Carbon Dioxide 20 (L) 23 - 31 mmol/L    Glucose Level 271 (H) 82 - 115 mg/dL    Blood Urea Nitrogen 78.3 (H) 8.4 - 25.7 mg/dL    Creatinine 2.95 (H) 0.73 - 1.18 mg/dL    Calcium Level Total 9.0 8.8 - 10.0 mg/dL    Protein Total 7.0 5.8 - 7.6 gm/dL    Albumin Level 2.2 (L) 3.4 - 4.8 g/dL    Globulin 4.8 (H) 2.4 - 3.5 gm/dL    Albumin/Globulin Ratio 0.5 (L) 1.1 - 2.0 ratio    Bilirubin Total 0.5 <=1.5 mg/dL    Alkaline Phosphatase 134 40 - 150 unit/L    Alanine Aminotransferase 34 0 - 55 unit/L    Aspartate Aminotransferase 50 (H) 5 - 34 unit/L    eGFR 21 mls/min/1.73/m2   Lactic acid, plasma    Collection Time: 12/28/22 12:17 PM   Result Value Ref Range    Lactic Acid Level 1.5 0.5 - 2.2 mmol/L   Troponin I    Collection Time: 12/28/22 12:17 PM   Result Value Ref Range    Troponin-I 0.068 (H) 0.000 - 0.045 ng/mL   Brain natriuretic peptide    Collection Time: 12/28/22 12:17 PM   Result Value Ref Range    Natriuretic Peptide 213.0 (H) <=100.0 pg/mL   CBC with Differential    Collection Time: 12/28/22 12:17 PM   Result Value Ref Range    WBC 21.3 (H) 4.5 - 11.5 x10(3)/mcL    RBC 3.57 (L) 4.70 - 6.10 x10(6)/mcL    Hgb 10.7 (L) 14.0 - 18.0 gm/dL    Hct 32.2 (L) 42.0 - 52.0 %    MCV 90.2 80.0 - 94.0 fL    MCH 30.0 pg    MCHC 33.2 33.0 - 36.0 mg/dL    RDW 15.9 (H) 11.6 - 14.4 %    Platelet 203 140 - 371 x10(3)/mcL    MPV 9.7 9.4 - 12.4 fL    IG# 0.18 (H) 0 - 0.04 x10(3)/mcL    IG% 0.8 %    NRBC% 0.0 0 - 1 %   Manual Differential    Collection Time: 12/28/22 12:17 PM   Result Value Ref  Range    Neut Man 93 %    Lymph Man 3 %    Monocyte Man 4 %    Instr WBC 21 x10(3)/mcL    Abs Mono 0.84 0.1 - 1.3 x10(3)/mcL    Abs Lymp 0.63 0.6 - 4.6 x10(3)/mcL    Abs Neut 19.53 (H) 2.1 - 9.2 x10(3)/mcL    RBC Morph Abnormal (A) Normal    Poik 2+ (A) (none)    Lynne Cells 2+ (A) (none)    Platelet Est Normal Normal, Adequate   POCT ARTERIAL BLOOD GAS    Collection Time: 12/28/22 12:29 PM   Result Value Ref Range    POC PH 7.32 (A) 7.35 - 7.45    POC PCO2 40 35 - 45 mmHg    POC PO2 66 (A) 80 - 100 mmHg    POC HEMOGLOBIN 10.5 (A) 12.0 - 16.0 g/dL    POC SATURATED O2 90.9 %    POC O2Hb 92.0 (A) 94.0 - 97.0 %    POC COHb 2.5 %    POC MetHb 0.60 0.40 - 1.5 %    POC Potassium 4.3 3.5 - 5.0 mmol/l    POC Sodium 129 (A) 137 - 145 mmol/l    POC Ionized Calcium 1.15 1.12 - 1.23 mmol/l    Correct Temperature (PH) 7.32 (A) 7.35 - 7.45    Corrected Temperature (pCO2) 40 35 - 45 mmHg    Corrected Temperature (pO2) 66 (A) 80 - 100 mmHg    POC HCO3 20.6 (A) 22.0 - 26.0 mmol/l    Base Deficit -5.1 (A) -2.0 - 2.0 mmol/l    POC Temp 37.0 °C    Specimen source Arterial sample    COVID/FLU A&B PCR    Collection Time: 12/28/22  9:54 PM   Result Value Ref Range    Influenza A PCR Not Detected Not Detected    Influenza B PCR Not Detected Not Detected    SARS-CoV-2 PCR Detected (A) Not Detected   Troponin I    Collection Time: 12/29/22 12:06 AM   Result Value Ref Range    Troponin-I 0.047 (H) 0.000 - 0.045 ng/mL   Comprehensive metabolic panel    Collection Time: 12/29/22  4:52 AM   Result Value Ref Range    Sodium Level 134 (L) 136 - 145 mmol/L    Potassium Level 4.7 3.5 - 5.1 mmol/L    Chloride 105 98 - 107 mmol/L    Carbon Dioxide 18 (L) 23 - 31 mmol/L    Glucose Level 173 (H) 82 - 115 mg/dL    Blood Urea Nitrogen 73.0 (H) 8.4 - 25.7 mg/dL    Creatinine 2.37 (H) 0.73 - 1.18 mg/dL    Calcium Level Total 7.9 (L) 8.8 - 10.0 mg/dL    Protein Total 5.7 (L) 5.8 - 7.6 gm/dL    Albumin Level 2.0 (L) 3.4 - 4.8 g/dL    Globulin 3.7 (H) 2.4 - 3.5  gm/dL    Albumin/Globulin Ratio 0.5 (L) 1.1 - 2.0 ratio    Bilirubin Total 0.4 <=1.5 mg/dL    Alkaline Phosphatase 107 40 - 150 unit/L    Alanine Aminotransferase 42 0 - 55 unit/L    Aspartate Aminotransferase 69 (H) 5 - 34 unit/L    eGFR 27 mls/min/1.73/m2   Troponin I    Collection Time: 12/29/22  4:52 AM   Result Value Ref Range    Troponin-I 0.118 (H) 0.000 - 0.045 ng/mL   CBC with Differential    Collection Time: 12/29/22  4:52 AM   Result Value Ref Range    WBC 20.6 (H) 4.5 - 11.5 x10(3)/mcL    RBC 3.31 (L) 4.70 - 6.10 x10(6)/mcL    Hgb 10.0 (L) 14.0 - 18.0 gm/dL    Hct 29.9 (L) 42.0 - 52.0 %    MCV 90.3 80.0 - 94.0 fL    MCH 30.2 pg    MCHC 33.4 33.0 - 36.0 mg/dL    RDW 15.9 (H) 11.6 - 14.4 %    Platelet 190 140 - 371 x10(3)/mcL    MPV 10.1 9.4 - 12.4 fL    Neut % 93.6 %    Lymph % 3.3 %    Mono % 1.9 %    Eos % 0.0 %    Basophil % 0.4 %    Lymph # 0.69 0.6 - 4.6 x10(3)/mcL    Neut # 19.26 (H) 2.1 - 9.2 x10(3)/mcL    Mono # 0.39 0.1 - 1.3 x10(3)/mcL    Eos # 0.00 0 - 0.9 x10(3)/mcL    Baso # 0.09 0 - 0.2 x10(3)/mcL    IG# 0.17 (H) 0 - 0.04 x10(3)/mcL    IG% 0.8 %    NRBC% 0.0 0 - 1 %   POCT glucose    Collection Time: 12/29/22  6:19 AM   Result Value Ref Range    POCT Glucose 147 (H) 70 - 110 mg/dL       Significant Imaging:  Imaging Results              X-Ray Chest 1 View (Final result)  Result time 12/28/22 13:21:09      Final result by Cm Roberson MD (12/28/22 13:21:09)                   Impression:      Prominent interstitial markings with no focal opacification.  No prior imaging available for comparison.  Developing infectious process is not entirely excluded.      Electronically signed by: Cm Roberson  Date:    12/28/2022  Time:    13:21               Narrative:    EXAMINATION:  XR CHEST 1 VIEW    CLINICAL HISTORY:  Pneumonia, unspecified organism    TECHNIQUE:  Single view of the chest    COMPARISON:  No prior imaging available for comparison.    FINDINGS:  Prominent interstitial markings  with no focal opacification.    The cardiomediastinal silhouette is within normal limits.    No acute osseous abnormality.                                      EKG:    Results for orders placed or performed during the hospital encounter of 12/28/22   EKG 12-lead    Narrative    Test Reason : R06.02,    Vent. Rate : 063 BPM     Atrial Rate : 063 BPM     P-R Int : 202 ms          QRS Dur : 126 ms      QT Int : 444 ms       P-R-T Axes : 053 078 011 degrees     QTc Int : 454 ms    Sinus rhythm with occasional Premature ventricular complexes  Nonspecific intraventricular block  Abnormal ECG  No previous ECGs available  Confirmed by Maninder Laboy MD ([id number]) on 12/29/2022 12:03:21 AM    Referred By: ABHAY   SELF           Confirmed By:Maninder Laboy MD     Physical Exam  Constitutional:       General: He is not in acute distress.  HENT:      Nose: Congestion present.      Mouth/Throat:      Comments: Poor dentition, pharyngeal erythema  Eyes:      General: No scleral icterus.     Pupils: Pupils are equal, round, and reactive to light.   Cardiovascular:      Rate and Rhythm: Normal rate and regular rhythm.      Pulses: Normal pulses.   Pulmonary:      Effort: No respiratory distress.      Comments: On 2.5 L NC  Abdominal:      Palpations: Abdomen is soft.      Tenderness: There is no abdominal tenderness.   Musculoskeletal:      Cervical back: Neck supple.      Comments: No lower extremity edema   Neurological:      Mental Status: He is alert.       Home Medications:   No current facility-administered medications on file prior to encounter.     Current Outpatient Medications on File Prior to Encounter   Medication Sig Dispense Refill    amLODIPine (NORVASC) 2.5 MG tablet Take 2.5 mg by mouth once daily.      ascorbic acid, vitamin C, (VITAMIN C) 500 MG tablet Take 500 mg by mouth 2 (two) times daily. Stop date: 01/08/23      benazepriL (LOTENSIN) 10 MG tablet Take 10 mg by mouth once daily.      dexAMETHasone (DECADRON) 6 MG  tablet Take 6 mg by mouth every evening. Stop date: 01/04/23      finasteride (PROSCAR) 5 mg tablet once daily.      pioglitazone (ACTOS) 15 MG tablet Take 15 mg by mouth once daily.      potassium chloride (MICRO-K) 10 MEQ CpSR Take 20 mEq by mouth once.      tamsulosin (FLOMAX) 0.4 mg Cap Take 0.4 mg by mouth once daily.      vitamin D (VITAMIN D3) 1000 units Tab Take 5,000 Units by mouth once daily.      zinc sulfate (ZINCATE) 50 mg zinc (220 mg) capsule Take 220 mg by mouth once daily.      furosemide (LASIX) 40 MG tablet       mirtazapine (REMERON) 15 MG tablet       QUEtiapine (SEROQUEL) 25 MG Tab Take 25 mg by mouth.         Current Inpatient Medications:    Current Facility-Administered Medications:     ascorbic acid (vitamin C) tablet 500 mg, 500 mg, Oral, BID, Jose Mccauley MD    azithromycin 500 mg in dextrose 5 % 250 mL IVPB (ready to mix system), 500 mg, Intravenous, Daily, Jose Mccauley MD, Stopped at 12/29/22 0443    dexAMETHasone tablet 6 mg, 6 mg, Oral, Daily, Jose Mccauley MD    dextrose 10% bolus 125 mL 125 mL, 12.5 g, Intravenous, PRN, Jose Mccauley MD    dextrose 10% bolus 250 mL 250 mL, 25 g, Intravenous, PRN, Jose Mccauley MD    enoxaparin injection 30 mg, 30 mg, Subcutaneous, Daily, Jose Mccauley MD    finasteride tablet 5 mg, 5 mg, Oral, Daily, Jose Mccauley MD    furosemide tablet 20 mg, 20 mg, Oral, Daily, Jose Mccauley MD    glucagon (human recombinant) injection 1 mg, 1 mg, Intramuscular, PRN, Jose Mccauley MD    glucose chewable tablet 16 g, 16 g, Oral, PRN, Jose Mccauley MD    glucose chewable tablet 24 g, 24 g, Oral, PRN, Jose Mccauley MD    insulin aspart U-100 injection 0-5 Units, 0-5 Units, Subcutaneous, QID (AC + HS) PRN, Jose Mccauley MD    insulin aspart U-100 injection 1-10 Units, 1-10 Units, Subcutaneous, QID (AC + HS) PRN, Jose Mccauley MD    melatonin tablet 6 mg, 6 mg,  Oral, Nightly PRN, Jose Mccauley MD    mirtazapine tablet 15 mg, 15 mg, Oral, QHS, Jose Mccauley MD    piperacillin-tazobactam (ZOSYN) 4.5 g in dextrose 5 % in water (D5W) 5 % 100 mL IVPB (MB+), 4.5 g, Intravenous, Q8H, Jose Mccauley MD, Stopped at 12/29/22 0334    QUEtiapine tablet 25 mg, 25 mg, Oral, Daily, Jose Mccauley MD    sodium chloride 0.9% flush 10 mL, 10 mL, Intravenous, PRN, Jose Mccauley MD    tamsulosin 24 hr capsule 0.4 mg, 0.4 mg, Oral, Daily, Jose Mccauley MD    Pharmacy to dose Vancomycin consult, , , Once **AND** vancomycin - pharmacy to dose, , Intravenous, pharmacy to manage frequency, Jose Mccauley MD    vitamin D 1000 units tablet 5,000 Units, 5,000 Units, Oral, Daily, Jose Mccauley MD    zinc sulfate capsule 220 mg, 220 mg, Oral, Daily, Jose Mccauley MD    Current Outpatient Medications:     amLODIPine (NORVASC) 2.5 MG tablet, Take 2.5 mg by mouth once daily., Disp: , Rfl:     ascorbic acid, vitamin C, (VITAMIN C) 500 MG tablet, Take 500 mg by mouth 2 (two) times daily. Stop date: 01/08/23, Disp: , Rfl:     benazepriL (LOTENSIN) 10 MG tablet, Take 10 mg by mouth once daily., Disp: , Rfl:     dexAMETHasone (DECADRON) 6 MG tablet, Take 6 mg by mouth every evening. Stop date: 01/04/23, Disp: , Rfl:     finasteride (PROSCAR) 5 mg tablet, once daily., Disp: , Rfl:     pioglitazone (ACTOS) 15 MG tablet, Take 15 mg by mouth once daily., Disp: , Rfl:     potassium chloride (MICRO-K) 10 MEQ CpSR, Take 20 mEq by mouth once., Disp: , Rfl:     tamsulosin (FLOMAX) 0.4 mg Cap, Take 0.4 mg by mouth once daily., Disp: , Rfl:     vitamin D (VITAMIN D3) 1000 units Tab, Take 5,000 Units by mouth once daily., Disp: , Rfl:     zinc sulfate (ZINCATE) 50 mg zinc (220 mg) capsule, Take 220 mg by mouth once daily., Disp: , Rfl:     furosemide (LASIX) 40 MG tablet, , Disp: , Rfl:     mirtazapine (REMERON) 15 MG tablet, , Disp: , Rfl:      QUEtiapine (SEROQUEL) 25 MG Tab, Take 25 mg by mouth., Disp: , Rfl:          VTE Risk Mitigation (From admission, onward)           Ordered     enoxaparin injection 30 mg  Daily         12/28/22 2315     IP VTE HIGH RISK PATIENT  Once         12/28/22 2315     Place sequential compression device  Until discontinued         12/28/22 2315                    Assessment:   Elevated troponin probably NSTEMI type 2 related to hypoxia   COVID pneumonia  CKD3  Cognitive decline  DM2  HTN  Nonspecific intraventricular heart block      Plan:     DYLON- low risk HEART  Mildly elevated BNP, TTE pending  Elevated troponin peak 0.118 peak likely 2/2 to hypoxemia and anemia  Start metoprolol, aspirin 81 mg daily  Start atorvastatin 20 mg pending improvement in LFTs  Holding ACE/ARB 2/2 renal dysfunction  May warrant OP ischemic workup, no acute invention indicated at this time , patient is COVID +  Supplemental Oxygen, wean as tolerated  Monitor electrolytes Mg/P/K, replete > 2/3/4 as needed       Thank you for your consult. Will Continue to follow.    Jack Shirley MD  Cardiology  [facility] - Emergency Dept  12/29/2022 7:47 AM    I have reviewed and concur with the resident's history, physical, assessment, and plan.  I have personally interviewed and examined the patient at bedside.

## 2022-12-29 NOTE — PROGRESS NOTES
Pharmacokinetic Initial Assessment: IV Vancomycin    Assessment/Plan:    Initiate intravenous vancomycin with loading dose of 1000 mg once with subsequent doses when random concentrations are less than 20 mcg/mL  Desired empiric serum trough concentration is 15 to 20 mcg/mL  Draw vancomycin random level on 12/29 at 1300.  Pharmacy will continue to follow and monitor vancomycin.      Please contact pharmacy at extension [phone] with any questions regarding this assessment.     Thank you for the consult,   Manan Hargrove       Patient brief summary:  Caryn Lorenzo is a 82 y.o. male initiated on antimicrobial therapy with IV Vancomycin for treatment of suspected bacteremia    Drug Allergies:   Review of patient's allergies indicates:  No Known Allergies    Actual Body Weight:   82.1 kg    Renal Function:   CrCl cannot be calculated (Unknown ideal weight.).,     Dialysis Method (if applicable):  N/A    CBC (last 72 hours):  Recent Labs   Lab Result Units 12/26/22  1345 12/28/22  1217   WBC x10(3)/mcL 9.1 21.3*   Hgb gm/dL 10.6* 10.7*   Hct % 31.7* 32.2*   Platelet x10(3)/mcL 203 203   Monocyte Man % 6 4       Metabolic Panel (last 72 hours):  Recent Labs   Lab Result Units 12/26/22  0630 12/26/22  1345 12/28/22  1217   Sodium Level mmol/L 135* 131* 133*   Potassium Level mmol/L 4.4 4.9 4.6   Chloride mmol/L 101 97* 101   Carbon Dioxide mmol/L 20* 22* 20*   Glucose Level mg/dL 96 95 271*   Blood Urea Nitrogen mg/dL 50.6* 53.2* 78.3*   Creatinine mg/dL 2.07* 2.07* 2.95*   Albumin Level g/dL  --  2.5* 2.2*   Bilirubin Total mg/dL  --  0.6 0.5   Alkaline Phosphatase unit/L  --  110 134   Aspartate Aminotransferase unit/L  --  47* 50*   Alanine Aminotransferase unit/L  --  41 34   Magnesium Level mg/dL  --  1.70  --        Drug levels (last 3 results):  No results for input(s): VANCOMYCINRA, VANCORANDOM, VANCOMYCINPE, VANCOPEAK, VANCOMYCINTR, VANCOTROUGH in the last 72 hours.    Microbiologic Results:  Microbiology Results (last  7 days)       Procedure Component Value Units Date/Time    Blood Culture #1 **CANNOT BE ORDERED STAT** [[id number]] Collected: 12/28/22 1217    Order Status: Resulted Specimen: Blood Updated: 12/28/22 1238    Blood Culture #2 **CANNOT BE ORDERED STAT** [[id number]] Collected: 12/28/22 1226    Order Status: Resulted Specimen: Blood Updated: 12/28/22 1238

## 2022-12-29 NOTE — H&P
[facility] Hospital Medicine History & Physical Examination       Patient Name: Caryn Lorenzo  MRN: [id number]  Patient Class: IP- Inpatient   Admission Date: 12/28/2022 11:59 AM  Length of Stay: 0  Admitting Service: Hospital Medicine   Attending Physician: Jose Mccauley MD   Primary Care Provider: No primary care provider on file.  History source: EMR, patient and/or patient's family    CHIEF COMPLAINT   Shortness of Breath (Pt to ER via [facility] for SOB.  Pt resident of [facility].  Recently Covid+ and pneumonia, noted to have low BP and sats today.  Sent for eval)    HISTORY OF PRESENT ILLNESS:   Patient is an 82-year-old male with a past medical history of unspecified cognitive deficit, chronic kidney disease stage IIIB, DM2, HTN, hyperuricemia and anemia of chronic disease who was brought from his nursing home at \[facility]\"" due to shortness of breath and recent diagnosis of COVID-19.  Patient arrived borderline hypotensive, tachypneic, hypothermic and ultimately was placed on a Gerri Hugger.  He was hypoxic and required 3 L nasal cannula oxygen.  Laboratory work was significant for leukocytosis of 21 K, acute on chronic kidney disease, mildly elevated troponin of 0.068, positive SARS-COVID-2.  Chest x-ray showed prominent interstitial markings.  He was given IV fluids, vancomycin and Zosyn admitted to the hospitalist service for further management.    PAST MEDICAL HISTORY:   Cognitive deficit   Chronic kidney disease stage IIIB   Type 2 diabetes mellitus  Hypertension  Hyperuricemia   Anemia of chronic disease    PAST SURGICAL HISTORY:   Denies surgical history    ALLERGIES:   Patient has no known allergies.    FAMILY HISTORY:   Reviewed and non-contributory     SOCIAL HISTORY:     Social History     Tobacco Use    Smoking status: Not on file    Smokeless tobacco: Not on file   Substance Use Topics    Alcohol use: Not on file        HOME MEDICATIONS:      Prior to Admission medications    Medication Sig Start Date End Date Taking? Authorizing Provider   amLODIPine (NORVASC) 2.5 MG tablet Take 2.5 mg by mouth once daily. 8/15/22  Yes Historical Provider   ascorbic acid, vitamin C, (VITAMIN C) 500 MG tablet Take 500 mg by mouth 2 (two) times daily. Stop date: 01/08/23   Yes Historical Provider   benazepriL (LOTENSIN) 10 MG tablet Take 10 mg by mouth once daily. 2/9/22  Yes Historical Provider   dexAMETHasone (DECADRON) 6 MG tablet Take 6 mg by mouth every evening. Stop date: 01/04/23 12/26/22  Yes Historical Provider   finasteride (PROSCAR) 5 mg tablet once daily. 2/7/22  Yes Historical Provider   pioglitazone (ACTOS) 15 MG tablet Take 15 mg by mouth once daily. 12/27/22  Yes Historical Provider   potassium chloride (MICRO-K) 10 MEQ CpSR Take 20 mEq by mouth once.   Yes Historical Provider   tamsulosin (FLOMAX) 0.4 mg Cap Take 0.4 mg by mouth once daily. 2/7/22  Yes Historical Provider   vitamin D (VITAMIN D3) 1000 units Tab Take 5,000 Units by mouth once daily.  1/9/23 Yes Historical Provider   zinc sulfate (ZINCATE) 50 mg zinc (220 mg) capsule Take 220 mg by mouth once daily.  1/9/23 Yes Historical Provider   furosemide (LASIX) 40 MG tablet  2/7/22   Historical Provider   mirtazapine (REMERON) 15 MG tablet  2/7/22   Historical Provider   QUEtiapine (SEROQUEL) 25 MG Tab Take 25 mg by mouth. 12/14/22   Historical Provider       REVIEW OF SYSTEMS:   Except as documented, all other systems reviewed and negative     PHYSICAL EXAM:   T 97.4 °F (36.3 °C)   BP (!) 98/51   P 65   RR (!) 28   O2 (!) 94 %  GENERAL: awake, alert, oriented to self/location but not year  HEENT: normocephalic atraumatic   NECK: supple   LUNGS:  Coarse bilaterally, no accessory muscle use   CVS: Regular rate and rhythm, normal peripheral perfusion  ABD: Soft, non-tender, non-distended, bowel sounds present  EXTREMITIES: no clubbing or cyanosis  SKIN: Warm, dry.   NEURO: alert and oriented,  grossly without focal deficits   PSYCHIATRIC: Cooperative    LABS AND IMAGING:     Recent Labs     12/26/22  1345 12/28/22  1217   WBC 9.1 21.3*   RBC 3.48* 3.57*   HGB 10.6* 10.7*   HCT 31.7* 32.2*   MCV 91.1 90.2   MCH 30.5 30.0   MCHC 33.4 33.2   RDW 15.9* 15.9*    203     Recent Labs     12/28/22  1217   LACTIC 1.5     No results for input(s): INR, APTT, D-DIMER in the last 72 hours.  No results for input(s): HGBA1C, CHOL, TRIG, LDL, VLDL, HDL in the last 72 hours.   Recent Labs     12/26/22  1345 12/28/22  1217   * 133*   K 4.9 4.6   CHLORIDE 97* 101   CO2 22* 20*   BUN 53.2* 78.3*   CREATININE 2.07* 2.95*   GLUCOSE 95 271*   CALCIUM 8.5* 9.0   MG 1.70  --    ALBUMIN 2.5* 2.2*   GLOBULIN 4.0* 4.8*   ALKPHOS 110 134   ALT 41 34   AST 47* 50*   BILITOT 0.6 0.5     Recent Labs     12/28/22  1217   .0*   TROPONINI 0.068*          X-Ray Chest 1 View  Impression: Prominent interstitial markings with no focal opacification.  No prior imaging available for comparison.  Developing infectious process is not entirely excluded.  Electronically signed by: Cm Roberson  Date:    12/28/2022  Time:    13:21      ASSESSMENT & PLAN:   COVID-19 infection with pneumonia/pneumonitis   Possible secondary bacterial pneumonia with sepsis  Acute hypoxemic respiratory failure secondary to above   NSTEMI likely type 2 demand ischemia  Mild acute on chronic kidney disease stage IIIB   HX: Cognitive disorder deficit, DM2, HTN CKD 3B, hyperuricemia    -continue appropriate isolation precautions for COVID 19   -hold remdesivir due to GFR, start Decadron   -continue broad-spectrum antibacterials  -respiratory panel and blood cultures  -resume remainder of home medications as appropriate    DVT prophylaxis: lovenox  Code status:     If patient was admitted under observational status it is with my approval/permission.     At least 55 min was spent on this history and physical.  Time seen: 9PM   Critical care time = 35 min;  Critical care diagnosis = sepsis, hypoxia  Jose Mccauley MD

## 2022-12-29 NOTE — PROGRESS NOTES
[facility]  Hospital Medicine Progress Note        Chief Complaint: Inpatient Follow-up    HPI:   82-year-old male with significant if chronic kidney IIIb, HTN, hyperuricemia, type 2 diabetes mellitus, anemia of chronic disease, cognitive deficit presented to the ED from nursing home with complaints of shortness of breath.  Patient was recently diagnosed with COVID-19 infection.  Patient was borderline hypotensive, tachypneic, hypothermic in the ED.  Placed on Gerri Hugger.  Oxygen supplementation given and hypoxemia improved.  Lab significant for severe leukocytosis, mildly elevated troponin which later trended up.  Patient also had worsening renal insufficiency compared to baseline.  COVID-19 PCR positive.  Chest x-ray with prominent interstitial markings.  Patient was initiated on broad-spectrum antimicrobials, IV fluids and was admitted to hospitalist medicine service    Interval Hx:   * accidentally missed interval history on the day of encounter and this was added at a later date    Patient in fact was seen at bedside on this encounter date.  Comfortably laying in bed.  No acute distress.  No acute events overnight.  Saturation stable in mid to high 90s.  Awake and alert.    Objective/physical exam:  General: In no acute distress, afebrile  Chest: Clear to auscultation bilaterally  Heart: S1, S2, no appreciable murmur  Abdomen: Soft, nontender, BS +  MSK: Warm, no lower extremity edema, no clubbing or cyanosis  Neurologic:  Awake, alert, seems oriented x2    VITAL SIGNS: 24 HRS MIN & MAX LAST   Temp  Min: 93.7 °F (34.3 °C)  Max: 97.4 °F (36.3 °C) 97.4 °F (36.3 °C)   BP  Min: 81/37  Max: 131/70 102/61   Pulse  Min: 55  Max: 90  66   Resp  Min: 18  Max: 35 (!) 25     SpO2  Min: 92 %  Max: 98 % 97 %       Recent Labs   Lab 12/29/22  0452   WBC 20.6*   RBC 3.31*   HGB 10.0*   HCT 29.9*   MCV 90.3   MCH 30.2   MCHC 33.4   RDW 15.9*      MPV 10.1         Recent Labs   Lab  12/26/22  1345 12/28/22  1217 12/28/22  1229 12/29/22  0452   *   < >  --  134*   K 4.9   < >  --  4.7   CO2 22*   < >  --  18*   BUN 53.2*   < >  --  73.0*   CREATININE 2.07*   < >  --  2.37*   CALCIUM 8.5*   < >  --  7.9*   PH  --   --  7.32*  --    MG 1.70  --   --   --    ALBUMIN 2.5*   < >  --  2.0*   ALKPHOS 110   < >  --  107   ALT 41   < >  --  42   AST 47*   < >  --  69*   BILITOT 0.6   < >  --  0.4    < > = values in this interval not displayed.          Microbiology Results (last 7 days)       Procedure Component Value Units Date/Time    Blood Culture #1 **CANNOT BE ORDERED STAT** [[id number]] Collected: 12/28/22 1217    Order Status: Resulted Specimen: Blood Updated: 12/28/22 1238    Blood Culture #2 **CANNOT BE ORDERED STAT** [[id number]] Collected: 12/28/22 1226    Order Status: Resulted Specimen: Blood Updated: 12/28/22 1238             Scheduled Med:   ascorbic acid (vitamin C)  500 mg Oral BID    azithromycin 500 mg in dextrose 5 % 250 mL IVPB (ready to mix system)  500 mg Intravenous Daily    dexAMETHasone  6 mg Oral Daily    enoxaparin  30 mg Subcutaneous Daily    finasteride  5 mg Oral Daily    furosemide  20 mg Oral Daily    mirtazapine  15 mg Oral QHS    piperacillin-tazobactam (ZOSYN) IVPB  4.5 g Intravenous Q8H    QUEtiapine  25 mg Oral Daily    tamsulosin  0.4 mg Oral Daily    vitamin D  5,000 Units Oral Daily    zinc sulfate  220 mg Oral Daily          Assessment/Plan:    Severe COVID-19 pneumonitis   Acute hypoxemic respiratory failure secondary to above   Sirs-sepsis secondary to COVID-19 versus superimposed bacterial infection   Acute on chronic kidney disease, stage IIIB  NSTEMI-possible type 2  Type 2 diabetes mellitus   Essential HTN-stable   Hyperuricemia   Anemia of chronic disease   Prophylaxis    COVID-19 treatment protocol with Decadron 6 mg daily  Hold remdesivir given significant renal insufficiency   Supplemental oxygen to keep saturations more than 90%   Patient has  significant leukocytosis and there is concern for possible superimposed bacterial infection   Initiated Zosyn, doxycycline  DC vanc given renal insufficiency   MRSA PCR ordered  Follow-up blood cultures from admit   Follow-up urine cultures  Placed him on gentle hydration with half-normal and 75 mEq bicarb at 60 cc/hour times 24 hours for Leon on CKD   Avoid nephrotoxins  Hold home Lasix  Sliding scale for diabetes mellitus  Patient had elevated troponins on admit, most likely this is type 2 demand ischemia   Ordered echocardiogram, cycle troponins   Cardiology evaluated, planning for outpatient ischemic evaluation   Add aspirin, beta-blocker, statin  Resumed other home meds-finasteride, Remeron, Seroquel, Flomax, vitamin-D   DVT prophylaxis-Lovenox        Chula Solis MD   12/29/2022

## 2022-12-30 LAB
ALBUMIN SERPL-MCNC: 1.9 G/DL (ref 3.4–4.8)
ALBUMIN/GLOB SERPL: 0.4 RATIO (ref 1.1–2)
ALP SERPL-CCNC: 94 UNIT/L (ref 40–150)
ALT SERPL-CCNC: 35 UNIT/L (ref 0–55)
AST SERPL-CCNC: 41 UNIT/L (ref 5–34)
AV INDEX (PROSTH): 0.88
AV MEAN GRADIENT: 2 MMHG
AV PEAK GRADIENT: 4 MMHG
AV VALVE AREA: 2.75 CM2
AV VELOCITY RATIO: 0.77
BASOPHILS # BLD AUTO: 0.03 X10(3)/MCL (ref 0–0.2)
BASOPHILS NFR BLD AUTO: 0.2 %
BILIRUBIN DIRECT+TOT PNL SERPL-MCNC: 0.4 MG/DL
BUN SERPL-MCNC: 75.3 MG/DL (ref 8.4–25.7)
CALCIUM SERPL-MCNC: 8.7 MG/DL (ref 8.8–10)
CHLORIDE SERPL-SCNC: 108 MMOL/L (ref 98–107)
CO2 SERPL-SCNC: 19 MMOL/L (ref 23–31)
CREAT SERPL-MCNC: 2.17 MG/DL (ref 0.73–1.18)
CV ECHO LV RWT: 0.49 CM
DOP CALC AO PEAK VEL: 1.03 M/S
DOP CALC AO VTI: 22.6 CM
DOP CALC LVOT AREA: 3.1 CM2
DOP CALC LVOT DIAMETER: 2 CM
DOP CALC LVOT PEAK VEL: 0.79 M/S
DOP CALC LVOT STROKE VOLUME: 62.17 CM3
DOP CALC MV VTI: 24.3 CM
DOP CALCLVOT PEAK VEL VTI: 19.8 CM
E WAVE DECELERATION TIME: 259 MSEC
E/A RATIO: 0.53
E/E' RATIO: 13.14 M/S
ECHO LV POSTERIOR WALL: 1.36 CM (ref 0.6–1.1)
EJECTION FRACTION: 23 %
EOSINOPHIL # BLD AUTO: 0 X10(3)/MCL (ref 0–0.9)
EOSINOPHIL NFR BLD AUTO: 0 %
ERYTHROCYTE [DISTWIDTH] IN BLOOD BY AUTOMATED COUNT: 16.2 % (ref 11.6–14.4)
FRACTIONAL SHORTENING: 18 % (ref 28–44)
GFR SERPLBLD CREATININE-BSD FMLA CKD-EPI: 30 MLS/MIN/1.73/M2
GLOBULIN SER-MCNC: 4.3 GM/DL (ref 2.4–3.5)
GLUCOSE SERPL-MCNC: 169 MG/DL (ref 82–115)
HCT VFR BLD AUTO: 31.9 % (ref 42–52)
HGB BLD-MCNC: 10.4 GM/DL (ref 14–18)
IMM GRANULOCYTES # BLD AUTO: 0.15 X10(3)/MCL (ref 0–0.04)
IMM GRANULOCYTES NFR BLD AUTO: 0.9 %
INTERVENTRICULAR SEPTUM: 1.46 CM (ref 0.6–1.1)
LEFT ATRIUM SIZE: 4 CM
LEFT INTERNAL DIMENSION IN SYSTOLE: 4.56 CM (ref 2.1–4)
LEFT VENTRICLE DIASTOLIC VOLUME: 152 ML
LEFT VENTRICLE SYSTOLIC VOLUME: 95.4 ML
LEFT VENTRICULAR INTERNAL DIMENSION IN DIASTOLE: 5.57 CM (ref 3.5–6)
LEFT VENTRICULAR MASS: 348.19 G
LV LATERAL E/E' RATIO: 11.5 M/S
LV SEPTAL E/E' RATIO: 15.33 M/S
LVOT MG: 1 MMHG
LVOT MV: 0.55 CM/S
LYMPHOCYTES # BLD AUTO: 0.46 X10(3)/MCL (ref 0.6–4.6)
LYMPHOCYTES NFR BLD AUTO: 2.8 %
MCH RBC QN AUTO: 29.7 PG
MCHC RBC AUTO-ENTMCNC: 32.6 MG/DL (ref 33–36)
MCV RBC AUTO: 91.1 FL (ref 80–94)
MONOCYTES # BLD AUTO: 0.45 X10(3)/MCL (ref 0.1–1.3)
MONOCYTES NFR BLD AUTO: 2.8 %
MV MEAN GRADIENT: 2 MMHG
MV PEAK A VEL: 0.87 M/S
MV PEAK E VEL: 0.46 M/S
MV PEAK GRADIENT: 4 MMHG
MV STENOSIS PRESSURE HALF TIME: 36 MS
MV VALVE AREA BY CONTINUITY EQUATION: 2.56 CM2
MV VALVE AREA P 1/2 METHOD: 6.11 CM2
NEUTROPHILS # BLD AUTO: 15.17 X10(3)/MCL (ref 2.1–9.2)
NEUTROPHILS NFR BLD AUTO: 93.3 %
NRBC BLD AUTO-RTO: 0 % (ref 0–1)
PISA TR MAX VEL: 2.79 M/S
PLATELET # BLD AUTO: 186 X10(3)/MCL (ref 140–371)
PMV BLD AUTO: 10.3 FL (ref 9.4–12.4)
POCT GLUCOSE: 188 MG/DL (ref 70–110)
POCT GLUCOSE: 197 MG/DL (ref 70–110)
POCT GLUCOSE: 342 MG/DL (ref 70–110)
POTASSIUM SERPL-SCNC: 4.6 MMOL/L (ref 3.5–5.1)
PROT SERPL-MCNC: 6.2 GM/DL (ref 5.8–7.6)
PV PEAK VELOCITY: 0.91 CM/S
RA PRESSURE: 3 MMHG
RBC # BLD AUTO: 3.5 X10(6)/MCL (ref 4.7–6.1)
SODIUM SERPL-SCNC: 138 MMOL/L (ref 136–145)
TDI LATERAL: 0.04 M/S
TDI SEPTAL: 0.03 M/S
TDI: 0.04 M/S
TR MAX PG: 31 MMHG
TRICUSPID ANNULAR PLANE SYSTOLIC EXCURSION: 2.17 CM
TV REST PULMONARY ARTERY PRESSURE: 34 MMHG
WBC # SPEC AUTO: 16.3 X10(3)/MCL (ref 4.5–11.5)

## 2022-12-30 PROCEDURE — 36415 COLL VENOUS BLD VENIPUNCTURE: CPT | Performed by: INTERNAL MEDICINE

## 2022-12-30 PROCEDURE — 80053 COMPREHEN METABOLIC PANEL: CPT | Performed by: INTERNAL MEDICINE

## 2022-12-30 PROCEDURE — 94761 N-INVAS EAR/PLS OXIMETRY MLT: CPT

## 2022-12-30 PROCEDURE — 92610 EVALUATE SWALLOWING FUNCTION: CPT

## 2022-12-30 PROCEDURE — 27000207 HC ISOLATION

## 2022-12-30 PROCEDURE — 27000221 HC OXYGEN, UP TO 24 HOURS

## 2022-12-30 PROCEDURE — 25000242 PHARM REV CODE 250 ALT 637 W/ HCPCS: Performed by: INTERNAL MEDICINE

## 2022-12-30 PROCEDURE — 25000003 PHARM REV CODE 250: Performed by: INTERNAL MEDICINE

## 2022-12-30 PROCEDURE — 85025 COMPLETE CBC W/AUTO DIFF WBC: CPT | Performed by: INTERNAL MEDICINE

## 2022-12-30 PROCEDURE — 21400001 HC TELEMETRY ROOM

## 2022-12-30 PROCEDURE — 63600175 PHARM REV CODE 636 W HCPCS: Performed by: INTERNAL MEDICINE

## 2022-12-30 RX ORDER — GUAIFENESIN 100 MG/5ML
200 SOLUTION ORAL EVERY 4 HOURS PRN
Status: DISCONTINUED | OUTPATIENT
Start: 2022-12-30 | End: 2023-01-05 | Stop reason: HOSPADM

## 2022-12-30 RX ORDER — QUETIAPINE FUMARATE 25 MG/1
25 TABLET, FILM COATED ORAL NIGHTLY
Status: DISCONTINUED | OUTPATIENT
Start: 2022-12-30 | End: 2023-01-01

## 2022-12-30 RX ORDER — QUETIAPINE FUMARATE 25 MG/1
25 TABLET, FILM COATED ORAL ONCE
Status: DISCONTINUED | OUTPATIENT
Start: 2022-12-30 | End: 2022-12-30

## 2022-12-30 RX ADMIN — INSULIN ASPART 8 UNITS: 100 INJECTION, SOLUTION INTRAVENOUS; SUBCUTANEOUS at 12:12

## 2022-12-30 RX ADMIN — INSULIN ASPART 2 UNITS: 100 INJECTION, SOLUTION INTRAVENOUS; SUBCUTANEOUS at 06:12

## 2022-12-30 RX ADMIN — INSULIN ASPART 2 UNITS: 100 INJECTION, SOLUTION INTRAVENOUS; SUBCUTANEOUS at 08:12

## 2022-12-30 RX ADMIN — DOXYCYCLINE HYCLATE 100 MG: 100 TABLET, COATED ORAL at 09:12

## 2022-12-30 RX ADMIN — DOXYCYCLINE HYCLATE 100 MG: 100 TABLET, COATED ORAL at 08:12

## 2022-12-30 RX ADMIN — ATORVASTATIN CALCIUM 20 MG: 10 TABLET, FILM COATED ORAL at 09:12

## 2022-12-30 RX ADMIN — ZINC SULFATE 220 MG (50 MG) CAPSULE 220 MG: CAPSULE at 09:12

## 2022-12-30 RX ADMIN — Medication 500 MG: at 08:12

## 2022-12-30 RX ADMIN — DEXAMETHASONE 6 MG: 2 TABLET ORAL at 09:12

## 2022-12-30 RX ADMIN — TAMSULOSIN HYDROCHLORIDE 0.4 MG: 0.4 CAPSULE ORAL at 09:12

## 2022-12-30 RX ADMIN — PIPERACILLIN AND TAZOBACTAM 4.5 G: 4; .5 INJECTION, POWDER, FOR SOLUTION INTRAVENOUS; PARENTERAL at 09:12

## 2022-12-30 RX ADMIN — FINASTERIDE 5 MG: 5 TABLET, FILM COATED ORAL at 09:12

## 2022-12-30 RX ADMIN — Medication 500 MG: at 09:12

## 2022-12-30 RX ADMIN — PIPERACILLIN AND TAZOBACTAM 4.5 G: 4; .5 INJECTION, POWDER, FOR SOLUTION INTRAVENOUS; PARENTERAL at 04:12

## 2022-12-30 RX ADMIN — MIRTAZAPINE 15 MG: 15 TABLET, FILM COATED ORAL at 08:12

## 2022-12-30 RX ADMIN — METOPROLOL SUCCINATE 25 MG: 25 TABLET, EXTENDED RELEASE ORAL at 09:12

## 2022-12-30 RX ADMIN — ENOXAPARIN SODIUM 30 MG: 30 INJECTION SUBCUTANEOUS at 05:12

## 2022-12-30 RX ADMIN — QUETIAPINE FUMARATE 25 MG: 25 TABLET ORAL at 08:12

## 2022-12-30 RX ADMIN — ASPIRIN 81 MG: 81 TABLET, COATED ORAL at 09:12

## 2022-12-30 RX ADMIN — CHOLECALCIFEROL TAB 25 MCG (1000 UNIT) 5000 UNITS: 25 TAB at 09:12

## 2022-12-30 NOTE — PROGRESS NOTES
[facility]  Hospital Medicine Progress Note        Chief Complaint: Inpatient Follow-up for covid    HPI:   82-year-old male with significant if chronic kidney IIIb, HTN, hyperuricemia, type 2 diabetes mellitus, anemia of chronic disease, cognitive deficit presented to the ED from nursing home with complaints of shortness of breath.  Patient was recently diagnosed with COVID-19 infection.  Patient was borderline hypotensive, tachypneic, hypothermic in the ED.  Placed on Gerri Hugger.  Oxygen supplementation given and hypoxemia improved.  Lab significant for severe leukocytosis, mildly elevated troponin which later trended up.  Patient also had worsening renal insufficiency compared to baseline.  COVID-19 PCR positive.  Chest x-ray with prominent interstitial markings.  Patient was initiated on broad-spectrum antimicrobials, IV fluids and was admitted to hospitalist medicine service    Interval Hx:   Patient reported improvement in shortness of breath.  He also reported having sore throat with cough.  Denied any chest pain.  TTE showed worsened EF 20-25% and possible LV thrombus.  Cardiology again reviewed the echo determine there is no LV thrombus.    Objective/physical exam:  General: In no acute distress, afebrile wearing nasal cannula  Chest:  Diminished breath sounds fine crackles bilaterally   Heart:  Regular S1, S2,   Abdomen: Soft, nontender, BS +  MSK:  Pedal edema left ankle is more swollen than left   Neurologic:  Awake, alert, seems oriented x2 pleasantly confused, moving all extremities    VITAL SIGNS: 24 HRS MIN & MAX LAST   Temp  Min: 95.9 °F (35.5 °C)  Max: 98.7 °F (37.1 °C) 98.2 °F (36.8 °C)   BP  Min: 104/58  Max: 130/73 123/67   Pulse  Min: 53  Max: 83  67   Resp  Min: 18  Max: 22 18     SpO2  Min: 93 %  Max: 97 % 96 %       Recent Labs   Lab 12/30/22  0423   WBC 16.3*   RBC 3.50*   HGB 10.4*   HCT 31.9*   MCV 91.1   MCH 29.7   MCHC 32.6*   RDW 16.2*      MPV  10.3         Recent Labs   Lab 12/26/22  1345 12/28/22  1217 12/28/22  1229 12/29/22  0452 12/30/22  0423   *   < >  --    < > 138   K 4.9   < >  --    < > 4.6   CO2 22*   < >  --    < > 19*   BUN 53.2*   < >  --    < > 75.3*   CREATININE 2.07*   < >  --    < > 2.17*   CALCIUM 8.5*   < >  --    < > 8.7*   PH  --   --  7.32*  --   --    MG 1.70  --   --   --   --    ALBUMIN 2.5*   < >  --    < > 1.9*   ALKPHOS 110   < >  --    < > 94   ALT 41   < >  --    < > 35   AST 47*   < >  --    < > 41*   BILITOT 0.6   < >  --    < > 0.4    < > = values in this interval not displayed.          Microbiology Results (last 7 days)       Procedure Component Value Units Date/Time    Blood Culture #2 **CANNOT BE ORDERED STAT** [[id number]]  (Normal) Collected: 12/28/22 1226    Order Status: Completed Specimen: Blood Updated: 12/30/22 1401     CULTURE, BLOOD ([facility]) No Growth At 48 Hours    Blood Culture #1 **CANNOT BE ORDERED STAT** [[id number]]  (Normal) Collected: 12/28/22 1217    Order Status: Completed Specimen: Blood Updated: 12/30/22 1401     CULTURE, BLOOD ([facility]) No Growth At 48 Hours    Urine culture [[id number]] Collected: 12/29/22 1436    Order Status: Completed Specimen: Urine Updated: 12/30/22 0628     Urine Culture No Growth At 24 Hours             Scheduled Med:   ascorbic acid (vitamin C)  500 mg Oral BID    aspirin  81 mg Oral Daily    atorvastatin  20 mg Oral Daily    dexAMETHasone  6 mg Oral Daily    doxycycline  100 mg Oral Q12H    enoxaparin  30 mg Subcutaneous Daily    finasteride  5 mg Oral Daily    metoprolol succinate  25 mg Oral Daily    mirtazapine  15 mg Oral QHS    perflutren lipid microspheres  1.3 mL Intravenous Once    piperacillin-tazobactam (ZOSYN) IVPB  4.5 g Intravenous Q8H    QUEtiapine  25 mg Oral QHS    tamsulosin  0.4 mg Oral Daily    vitamin D  5,000 Units Oral Daily    zinc sulfate  220 mg Oral Daily          Assessment/Plan:  Severe COVID-19 pneumonitis   Decompensated Heart failure with  reduced ejection fraction  Acute hypoxemic respiratory failure secondary to above   Sirs-sepsis secondary to COVID-19 versus superimposed bacterial infection   Acute on chronic kidney disease, stage IIIB  NSTEMI-possible type 2  Type 2 diabetes mellitus   Essential HTN-stable   Hyperuricemia   Anemia of chronic disease   Prophylaxis    Continue steroids  Creatinine slightly improved but BUN still elevated possibly from steroids.    We will discuss with pharmacy when to restart Remdesivir safely given renal injury.  Continue Supplemental oxygen to keep saturations more than 90% .  Patient currently on 2 L.  White count improved.  Continue IV antibiotics Zosyn, doxycycline blood cultures negative so far  Send sputum for MRSA PCR   We will obtain CT scan of the chest to determine infiltrates, effusions to guide need for diuretic therapy.  We will obtain lower extremity DVT study  Cardiology on board for heart failure.  Appreciate recommendations.Continue aspirin, beta-blockers .planning for outpatient ischemic evaluation   Echo with no LV thrombus.  Echo showed severely decreased EF 20-25%.  Avoid nephrotoxins  Blood sugars elevated likely from steroid use A1c 5.6 12/16/22.  Monitor blood sugars, continue Sliding scale for diabetes mellitus  Resumed other home meds-finasteride, Remeron, Seroquel, Flomax, vitamin-D     DVT prophylaxis-Lovenox    Critical care time spent:  35 minutes   Critical care diagnosis:  Sepsis Acute hypoxic respiratory failure, COVID, congestive heart failure    Deangelo Cabello MD   12/30/2022

## 2022-12-30 NOTE — PROGRESS NOTES
[facility] - Emergency Dept  Cardiology  Consult Note    Patient Name: Caryn Lorenzo  MRN: [id number]  Admission Date: 12/28/2022  Hospital Length of Stay: 2 days  Code Status: Full Code   Attending Provider: Jose Mccauley MD   Consulting Provider: Jack Shirley MD  Primary Care Physician: Primary Doctor No  Principal Problem:Pneumonia due to COVID-19 virus    Patient information was obtained from patient, relative(s), and ER records.     Subjective:     Chief Complaint:  shortness of breath     HPI: Patient is an 82 year old female NH patient unknown to [facility] with hx of CKD3, anemia of chronic disease, DM2, HTN, gout who has been admitted to [facility] IM service 2/2 shortness of breath and hypotension, found to be covid positive, hypoxic on 3 liters, minimally elevated troponin.    Interval Hx: NAEON. Patient overall feeling well aside from persistent cough. No chest pain, palpitations, lower extremity edema    PMH: see HPI  PSH: denies   Family History: n/a  Social History: lives [facility]    Previous Cardiac Diagnostics:   None on file    Past Medical History:   Diagnosis Date    BPH (benign prostatic hyperplasia)     Cognitive communication deficit        History reviewed. No pertinent surgical history.    Review of patient's allergies indicates:  No Known Allergies    No current facility-administered medications on file prior to encounter.     Current Outpatient Medications on File Prior to Encounter   Medication Sig    amLODIPine (NORVASC) 2.5 MG tablet Take 2.5 mg by mouth once daily.    ascorbic acid, vitamin C, (VITAMIN C) 500 MG tablet Take 500 mg by mouth 2 (two) times daily. Stop date: 01/08/23    benazepriL (LOTENSIN) 10 MG tablet Take 10 mg by mouth once daily.    dexAMETHasone (DECADRON) 6 MG tablet Take 6 mg by mouth every evening. Stop date: 01/04/23    finasteride (PROSCAR) 5 mg tablet once daily.    pioglitazone (ACTOS) 15 MG tablet Take 15 mg by mouth once daily.     potassium chloride (MICRO-K) 10 MEQ CpSR Take 20 mEq by mouth once.    tamsulosin (FLOMAX) 0.4 mg Cap Take 0.4 mg by mouth once daily.    vitamin D (VITAMIN D3) 1000 units Tab Take 5,000 Units by mouth once daily.    zinc sulfate (ZINCATE) 50 mg zinc (220 mg) capsule Take 220 mg by mouth once daily.    furosemide (LASIX) 40 MG tablet     mirtazapine (REMERON) 15 MG tablet     QUEtiapine (SEROQUEL) 25 MG Tab Take 25 mg by mouth.     Family History    None       Tobacco Use    Smoking status: Not on file    Smokeless tobacco: Not on file   Substance and Sexual Activity    Alcohol use: Not on file    Drug use: Not on file    Sexual activity: Not on file       Review of Systems   Constitutional:  Positive for fatigue. Negative for chills and fever.   HENT:  Positive for congestion and sore throat. Negative for trouble swallowing and voice change.    Eyes:  Negative for visual disturbance.   Respiratory:  Positive for cough, shortness of breath and wheezing.    Cardiovascular:  Negative for chest pain, palpitations and leg swelling.   Gastrointestinal:  Negative for abdominal pain, nausea and vomiting.   Genitourinary:  Negative for dysuria.   Musculoskeletal: Negative.    Skin: Negative.    Neurological: Negative.      Objective:     Vital Signs (Most Recent):  Temp: 98.7 °F (37.1 °C) (12/30/22 0700)  Pulse: 77 (12/30/22 0901)  Resp: 18 (12/30/22 0527)  BP: (!) 120/57 (12/30/22 0901)  SpO2: (!) 93 % (12/30/22 0527)   Vital Signs (24h Range):  Temp:  [95.9 °F (35.5 °C)-98.7 °F (37.1 °C)] 98.7 °F (37.1 °C)  Pulse:  [53-83] 77  Resp:  [18-25] 18  SpO2:  [93 %-97 %] 93 %  BP: ()/(53-73) 120/57     Weight: 82.1 kg (181 lb)  There is no height or weight on file to calculate BMI.    SpO2: (!) 93 %         Intake/Output Summary (Last 24 hours) at 12/30/2022 1050  Last data filed at 12/30/2022 0722  Gross per 24 hour   Intake 360 ml   Output 1701 ml   Net -1341 ml         Lines/Drains/Airways       Peripheral Intravenous  Line  Duration                  Peripheral IV - Single Lumen 24 G Anterior;Distal;Left Forearm -- days         Peripheral IV - Single Lumen 12/28/22 1206 20 G Right Forearm 1 day                    Significant Labs:  Recent Results (from the past 72 hour(s))   Comprehensive metabolic panel    Collection Time: 12/28/22 12:17 PM   Result Value Ref Range    Sodium Level 133 (L) 136 - 145 mmol/L    Potassium Level 4.6 3.5 - 5.1 mmol/L    Chloride 101 98 - 107 mmol/L    Carbon Dioxide 20 (L) 23 - 31 mmol/L    Glucose Level 271 (H) 82 - 115 mg/dL    Blood Urea Nitrogen 78.3 (H) 8.4 - 25.7 mg/dL    Creatinine 2.95 (H) 0.73 - 1.18 mg/dL    Calcium Level Total 9.0 8.8 - 10.0 mg/dL    Protein Total 7.0 5.8 - 7.6 gm/dL    Albumin Level 2.2 (L) 3.4 - 4.8 g/dL    Globulin 4.8 (H) 2.4 - 3.5 gm/dL    Albumin/Globulin Ratio 0.5 (L) 1.1 - 2.0 ratio    Bilirubin Total 0.5 <=1.5 mg/dL    Alkaline Phosphatase 134 40 - 150 unit/L    Alanine Aminotransferase 34 0 - 55 unit/L    Aspartate Aminotransferase 50 (H) 5 - 34 unit/L    eGFR 21 mls/min/1.73/m2   Blood Culture #1 **CANNOT BE ORDERED STAT**    Collection Time: 12/28/22 12:17 PM    Specimen: Blood   Result Value Ref Range    CULTURE, BLOOD ([facility]) No Growth At 24 Hours    Lactic acid, plasma    Collection Time: 12/28/22 12:17 PM   Result Value Ref Range    Lactic Acid Level 1.5 0.5 - 2.2 mmol/L   Troponin I    Collection Time: 12/28/22 12:17 PM   Result Value Ref Range    Troponin-I 0.068 (H) 0.000 - 0.045 ng/mL   Brain natriuretic peptide    Collection Time: 12/28/22 12:17 PM   Result Value Ref Range    Natriuretic Peptide 213.0 (H) <=100.0 pg/mL   CBC with Differential    Collection Time: 12/28/22 12:17 PM   Result Value Ref Range    WBC 21.3 (H) 4.5 - 11.5 x10(3)/mcL    RBC 3.57 (L) 4.70 - 6.10 x10(6)/mcL    Hgb 10.7 (L) 14.0 - 18.0 gm/dL    Hct 32.2 (L) 42.0 - 52.0 %    MCV 90.2 80.0 - 94.0 fL    MCH 30.0 pg    MCHC 33.2 33.0 - 36.0 mg/dL    RDW 15.9 (H) 11.6 - 14.4 %    Platelet  203 140 - 371 x10(3)/mcL    MPV 9.7 9.4 - 12.4 fL    IG# 0.18 (H) 0 - 0.04 x10(3)/mcL    IG% 0.8 %    NRBC% 0.0 0 - 1 %   Manual Differential    Collection Time: 12/28/22 12:17 PM   Result Value Ref Range    Neut Man 93 %    Lymph Man 3 %    Monocyte Man 4 %    Instr WBC 21 x10(3)/mcL    Abs Mono 0.84 0.1 - 1.3 x10(3)/mcL    Abs Lymp 0.63 0.6 - 4.6 x10(3)/mcL    Abs Neut 19.53 (H) 2.1 - 9.2 x10(3)/mcL    RBC Morph Abnormal (A) Normal    Poik 2+ (A) (none)    Bryant Cells 2+ (A) (none)    Platelet Est Normal Normal, Adequate   Blood Culture #2 **CANNOT BE ORDERED STAT**    Collection Time: 12/28/22 12:26 PM    Specimen: Blood   Result Value Ref Range    CULTURE, BLOOD ([facility]) No Growth At 24 Hours    POCT ARTERIAL BLOOD GAS    Collection Time: 12/28/22 12:29 PM   Result Value Ref Range    POC PH 7.32 (A) 7.35 - 7.45    POC PCO2 40 35 - 45 mmHg    POC PO2 66 (A) 80 - 100 mmHg    POC HEMOGLOBIN 10.5 (A) 12.0 - 16.0 g/dL    POC SATURATED O2 90.9 %    POC O2Hb 92.0 (A) 94.0 - 97.0 %    POC COHb 2.5 %    POC MetHb 0.60 0.40 - 1.5 %    POC Potassium 4.3 3.5 - 5.0 mmol/l    POC Sodium 129 (A) 137 - 145 mmol/l    POC Ionized Calcium 1.15 1.12 - 1.23 mmol/l    Correct Temperature (PH) 7.32 (A) 7.35 - 7.45    Corrected Temperature (pCO2) 40 35 - 45 mmHg    Corrected Temperature (pO2) 66 (A) 80 - 100 mmHg    POC HCO3 20.6 (A) 22.0 - 26.0 mmol/l    Base Deficit -5.1 (A) -2.0 - 2.0 mmol/l    POC Temp 37.0 °C    Specimen source Arterial sample    COVID/FLU A&B PCR    Collection Time: 12/28/22  9:54 PM   Result Value Ref Range    Influenza A PCR Not Detected Not Detected    Influenza B PCR Not Detected Not Detected    SARS-CoV-2 PCR Detected (A) Not Detected   Troponin I    Collection Time: 12/29/22 12:06 AM   Result Value Ref Range    Troponin-I 0.047 (H) 0.000 - 0.045 ng/mL   Comprehensive metabolic panel    Collection Time: 12/29/22  4:52 AM   Result Value Ref Range    Sodium Level 134 (L) 136 - 145 mmol/L    Potassium Level 4.7 3.5  - 5.1 mmol/L    Chloride 105 98 - 107 mmol/L    Carbon Dioxide 18 (L) 23 - 31 mmol/L    Glucose Level 173 (H) 82 - 115 mg/dL    Blood Urea Nitrogen 73.0 (H) 8.4 - 25.7 mg/dL    Creatinine 2.37 (H) 0.73 - 1.18 mg/dL    Calcium Level Total 7.9 (L) 8.8 - 10.0 mg/dL    Protein Total 5.7 (L) 5.8 - 7.6 gm/dL    Albumin Level 2.0 (L) 3.4 - 4.8 g/dL    Globulin 3.7 (H) 2.4 - 3.5 gm/dL    Albumin/Globulin Ratio 0.5 (L) 1.1 - 2.0 ratio    Bilirubin Total 0.4 <=1.5 mg/dL    Alkaline Phosphatase 107 40 - 150 unit/L    Alanine Aminotransferase 42 0 - 55 unit/L    Aspartate Aminotransferase 69 (H) 5 - 34 unit/L    eGFR 27 mls/min/1.73/m2   Troponin I    Collection Time: 12/29/22  4:52 AM   Result Value Ref Range    Troponin-I 0.118 (H) 0.000 - 0.045 ng/mL   CBC with Differential    Collection Time: 12/29/22  4:52 AM   Result Value Ref Range    WBC 20.6 (H) 4.5 - 11.5 x10(3)/mcL    RBC 3.31 (L) 4.70 - 6.10 x10(6)/mcL    Hgb 10.0 (L) 14.0 - 18.0 gm/dL    Hct 29.9 (L) 42.0 - 52.0 %    MCV 90.3 80.0 - 94.0 fL    MCH 30.2 pg    MCHC 33.4 33.0 - 36.0 mg/dL    RDW 15.9 (H) 11.6 - 14.4 %    Platelet 190 140 - 371 x10(3)/mcL    MPV 10.1 9.4 - 12.4 fL    Neut % 93.6 %    Lymph % 3.3 %    Mono % 1.9 %    Eos % 0.0 %    Basophil % 0.4 %    Lymph # 0.69 0.6 - 4.6 x10(3)/mcL    Neut # 19.26 (H) 2.1 - 9.2 x10(3)/mcL    Mono # 0.39 0.1 - 1.3 x10(3)/mcL    Eos # 0.00 0 - 0.9 x10(3)/mcL    Baso # 0.09 0 - 0.2 x10(3)/mcL    IG# 0.17 (H) 0 - 0.04 x10(3)/mcL    IG% 0.8 %    NRBC% 0.0 0 - 1 %   POCT glucose    Collection Time: 12/29/22  6:19 AM   Result Value Ref Range    POCT Glucose 147 (H) 70 - 110 mg/dL   Troponin I    Collection Time: 12/29/22 11:07 AM   Result Value Ref Range    Troponin-I 0.040 0.000 - 0.045 ng/mL   VANCOMYCIN, TROUGH    Collection Time: 12/29/22 11:31 AM   Result Value Ref Range    Vancomycin Trough 14.1 (L) 15.0 - 20.0 ug/ml   Echo    Collection Time: 12/29/22 11:33 AM   Result Value Ref Range    TDI SEPTAL 0.03 m/s    LV  LATERAL E/E' RATIO 11.50 m/s    LV SEPTAL E/E' RATIO 15.33 m/s    Right Atrial Pressure (from IVC) 3 mmHg    EF 23 %    Left Ventricular Outflow Tract Mean Velocity 0.55 cm/s    Left Ventricular Outflow Tract Mean Gradient 1.00 mmHg    TDI LATERAL 0.04 m/s    PV PEAK VELOCITY 0.91 cm/s    LVIDd 5.57 3.5 - 6.0 cm    IVS 1.46 (A) 0.6 - 1.1 cm    Posterior Wall 1.36 (A) 0.6 - 1.1 cm    LVIDs 4.56 (A) 2.1 - 4.0 cm    FS 18 28 - 44 %    LV mass 348.19 g    LA size 4.00 cm    TAPSE 2.17 cm    Left Ventricle Relative Wall Thickness 0.49 cm    AV mean gradient 2 mmHg    AV valve area 2.75 cm2    AV Velocity Ratio 0.77     AV index (prosthetic) 0.88     MV mean gradient 2 mmHg    MV valve area p 1/2 method 6.11 cm2    MV valve area by continuity eq 2.56 cm2    E/A ratio 0.53     Mean e' 0.04 m/s    E wave deceleration time 259.00 msec    LVOT diameter 2.00 cm    LVOT area 3.1 cm2    LVOT peak mark 0.79 m/s    LVOT peak VTI 19.80 cm    Ao peak mark 1.03 m/s    Ao VTI 22.6 cm    LVOT stroke volume 62.17 cm3    AV peak gradient 4 mmHg    MV peak gradient 4 mmHg    TV rest pulmonary artery pressure 34 mmHg    E/E' ratio 13.14 m/s    MV Peak E Mark 0.46 m/s    TR Max Mark 2.79 m/s    MV VTI 24.3 cm    MV stenosis pressure 1/2 time 36.00 ms    MV Peak A Mark 0.87 m/s    LV Systolic Volume 95.40 mL    LV Diastolic Volume 152.00 mL    Triscuspid Valve Regurgitation Peak Gradient 31 mmHg   Urinalysis, Reflex to Urine Culture Urine, Clean Catch    Collection Time: 12/29/22  2:36 PM    Specimen: Urine   Result Value Ref Range    Color, UA Yellow Yellow, Light-Yellow, Dark Yellow, Marielle, Straw    Appearance, UA Cloudy (A) Clear    Specific Gravity, UA 1.009 1.001 - 1.030    pH, UA 7.5 5.0 - 8.5    Protein, UA Trace (A) Negative mg/dL    Glucose, UA Negative Negative, Normal mg/dL    Ketones, UA Negative Negative mg/dL    Blood, UA 1+ (A) Negative unit/L    Bilirubin, UA Negative Negative mg/dL    Urobilinogen, UA 0.2 0.2, 1.0, Normal mg/dL     Nitrites, UA Negative Negative    Leukocyte Esterase, UA 3+ (A) Negative unit/L   Urinalysis, Microscopic    Collection Time: 12/29/22  2:36 PM   Result Value Ref Range    RBC, UA 11 (H) <=5 /HPF    WBC,  (H) <=5 /HPF    Squamous Epithelial Cells, UA <5 <=5 /HPF    Bacteria, UA None Seen None Seen, Rare, Occasional /HPF   Urine culture    Collection Time: 12/29/22  2:36 PM    Specimen: Urine   Result Value Ref Range    Urine Culture No Growth At 24 Hours    POCT glucose    Collection Time: 12/29/22 11:48 PM   Result Value Ref Range    POCT Glucose 190 (H) 70 - 110 mg/dL   Comprehensive Metabolic Panel    Collection Time: 12/30/22  4:23 AM   Result Value Ref Range    Sodium Level 138 136 - 145 mmol/L    Potassium Level 4.6 3.5 - 5.1 mmol/L    Chloride 108 (H) 98 - 107 mmol/L    Carbon Dioxide 19 (L) 23 - 31 mmol/L    Glucose Level 169 (H) 82 - 115 mg/dL    Blood Urea Nitrogen 75.3 (H) 8.4 - 25.7 mg/dL    Creatinine 2.17 (H) 0.73 - 1.18 mg/dL    Calcium Level Total 8.7 (L) 8.8 - 10.0 mg/dL    Protein Total 6.2 5.8 - 7.6 gm/dL    Albumin Level 1.9 (L) 3.4 - 4.8 g/dL    Globulin 4.3 (H) 2.4 - 3.5 gm/dL    Albumin/Globulin Ratio 0.4 (L) 1.1 - 2.0 ratio    Bilirubin Total 0.4 <=1.5 mg/dL    Alkaline Phosphatase 94 40 - 150 unit/L    Alanine Aminotransferase 35 0 - 55 unit/L    Aspartate Aminotransferase 41 (H) 5 - 34 unit/L    eGFR 30 mls/min/1.73/m2   CBC with Differential    Collection Time: 12/30/22  4:23 AM   Result Value Ref Range    WBC 16.3 (H) 4.5 - 11.5 x10(3)/mcL    RBC 3.50 (L) 4.70 - 6.10 x10(6)/mcL    Hgb 10.4 (L) 14.0 - 18.0 gm/dL    Hct 31.9 (L) 42.0 - 52.0 %    MCV 91.1 80.0 - 94.0 fL    MCH 29.7 pg    MCHC 32.6 (L) 33.0 - 36.0 mg/dL    RDW 16.2 (H) 11.6 - 14.4 %    Platelet 186 140 - 371 x10(3)/mcL    MPV 10.3 9.4 - 12.4 fL    Neut % 93.3 %    Lymph % 2.8 %    Mono % 2.8 %    Eos % 0.0 %    Basophil % 0.2 %    Lymph # 0.46 (L) 0.6 - 4.6 x10(3)/mcL    Neut # 15.17 (H) 2.1 - 9.2 x10(3)/mcL    Mono  # 0.45 0.1 - 1.3 x10(3)/mcL    Eos # 0.00 0 - 0.9 x10(3)/mcL    Baso # 0.03 0 - 0.2 x10(3)/mcL    IG# 0.15 (H) 0 - 0.04 x10(3)/mcL    IG% 0.9 %    NRBC% 0.0 0 - 1 %   POCT glucose    Collection Time: 12/30/22  6:11 AM   Result Value Ref Range    POCT Glucose 197 (H) 70 - 110 mg/dL       Significant Imaging:  Imaging Results              X-Ray Chest 1 View (Final result)  Result time 12/28/22 13:21:09      Final result by Cm Roberson MD (12/28/22 13:21:09)                   Impression:      Prominent interstitial markings with no focal opacification.  No prior imaging available for comparison.  Developing infectious process is not entirely excluded.      Electronically signed by: Cm Roberson  Date:    12/28/2022  Time:    13:21               Narrative:    EXAMINATION:  XR CHEST 1 VIEW    CLINICAL HISTORY:  Pneumonia, unspecified organism    TECHNIQUE:  Single view of the chest    COMPARISON:  No prior imaging available for comparison.    FINDINGS:  Prominent interstitial markings with no focal opacification.    The cardiomediastinal silhouette is within normal limits.    No acute osseous abnormality.                                      EKG:    Results for orders placed or performed during the hospital encounter of 12/28/22   EKG 12-lead    Narrative    Test Reason : R06.02,    Vent. Rate : 063 BPM     Atrial Rate : 063 BPM     P-R Int : 202 ms          QRS Dur : 126 ms      QT Int : 444 ms       P-R-T Axes : 053 078 011 degrees     QTc Int : 454 ms    Sinus rhythm with occasional Premature ventricular complexes  Nonspecific intraventricular block  Abnormal ECG  No previous ECGs available  Confirmed by Maninder Laboy MD ([id number]) on 12/29/2022 12:03:21 AM    Referred By: [email or web address]   SELF           Confirmed By:Maninder Laboy MD     Physical Exam  Constitutional:       General: He is not in acute distress.  HENT:      Nose: Congestion present.      Mouth/Throat:      Comments: Poor dentition, pharyngeal erythema  Eyes:       General: No scleral icterus.     Pupils: Pupils are equal, round, and reactive to light.   Cardiovascular:      Rate and Rhythm: Normal rate and regular rhythm.      Pulses: Normal pulses.   Pulmonary:      Effort: No respiratory distress.      Comments: On 2.5 L NC  Abdominal:      Palpations: Abdomen is soft.      Tenderness: There is no abdominal tenderness.   Musculoskeletal:      Cervical back: Neck supple.      Comments: No lower extremity edema   Neurological:      Mental Status: He is alert.       Home Medications:   No current facility-administered medications on file prior to encounter.     Current Outpatient Medications on File Prior to Encounter   Medication Sig Dispense Refill    amLODIPine (NORVASC) 2.5 MG tablet Take 2.5 mg by mouth once daily.      ascorbic acid, vitamin C, (VITAMIN C) 500 MG tablet Take 500 mg by mouth 2 (two) times daily. Stop date: 01/08/23      benazepriL (LOTENSIN) 10 MG tablet Take 10 mg by mouth once daily.      dexAMETHasone (DECADRON) 6 MG tablet Take 6 mg by mouth every evening. Stop date: 01/04/23      finasteride (PROSCAR) 5 mg tablet once daily.      pioglitazone (ACTOS) 15 MG tablet Take 15 mg by mouth once daily.      potassium chloride (MICRO-K) 10 MEQ CpSR Take 20 mEq by mouth once.      tamsulosin (FLOMAX) 0.4 mg Cap Take 0.4 mg by mouth once daily.      vitamin D (VITAMIN D3) 1000 units Tab Take 5,000 Units by mouth once daily.      zinc sulfate (ZINCATE) 50 mg zinc (220 mg) capsule Take 220 mg by mouth once daily.      furosemide (LASIX) 40 MG tablet       mirtazapine (REMERON) 15 MG tablet       QUEtiapine (SEROQUEL) 25 MG Tab Take 25 mg by mouth.         Current Inpatient Medications:    Current Facility-Administered Medications:     ascorbic acid (vitamin C) tablet 500 mg, 500 mg, Oral, BID, Jose Mccauley MD, 500 mg at 12/30/22 0901    aspirin EC tablet 81 mg, 81 mg, Oral, Daily, Chula Solis MD, 81 mg at 12/30/22 0902    atorvastatin tablet 20  mg, 20 mg, Oral, Daily, Chula Solis MD, 20 mg at 12/30/22 0902    dexAMETHasone tablet 6 mg, 6 mg, Oral, Daily, Jose Mccauley MD, 6 mg at 12/30/22 0901    dextrose 10% bolus 125 mL 125 mL, 12.5 g, Intravenous, PRN, Jose Mccauley MD    dextrose 10% bolus 250 mL 250 mL, 25 g, Intravenous, PRN, Jose Mccauley MD    doxycycline tablet 100 mg, 100 mg, Oral, Q12H, Chula Solis MD, 100 mg at 12/30/22 0902    enoxaparin injection 30 mg, 30 mg, Subcutaneous, Daily, Jose Mccauley MD    finasteride tablet 5 mg, 5 mg, Oral, Daily, Jose Mccauley MD, 5 mg at 12/30/22 0901    glucagon (human recombinant) injection 1 mg, 1 mg, Intramuscular, PRN, Jose Mccauley MD    glucose chewable tablet 16 g, 16 g, Oral, PRN, Jose Mccauley MD    glucose chewable tablet 24 g, 24 g, Oral, PRN, Jose Mccauley MD    insulin aspart U-100 injection 0-5 Units, 0-5 Units, Subcutaneous, QID (AC + HS) PRN, Jose Mccauley MD    insulin aspart U-100 injection 1-10 Units, 1-10 Units, Subcutaneous, QID (AC + HS) PRN, Jose Mccauley MD, 2 Units at 12/30/22 0616    melatonin tablet 6 mg, 6 mg, Oral, Nightly PRN, Jose Mccauley MD, 6 mg at 12/29/22 2206    metoprolol succinate (TOPROL-XL) 24 hr tablet 25 mg, 25 mg, Oral, Daily, Chula Solis MD, 25 mg at 12/30/22 0901    mirtazapine tablet 15 mg, 15 mg, Oral, QHS, Jose Mccauley MD, 15 mg at 12/29/22 2206    perflutren lipid microspheres injection 1.3 mL, 1.3 mL, Intravenous, Once, Jose Mccauley MD    piperacillin-tazobactam (ZOSYN) 4.5 g in dextrose 5 % in water (D5W) 5 % 100 mL IVPB (MB+), 4.5 g, Intravenous, Q8H, Jose Mccauley MD, Last Rate: 25 mL/hr at 12/30/22 0901, 4.5 g at 12/30/22 0901    QUEtiapine tablet 25 mg, 25 mg, Oral, QHS, Jose Mccauley MD    sodium chloride 0.9% flush 10 mL, 10 mL, Intravenous, PRN, Jose Mccauley MD    tamsulosin 24 hr capsule 0.4 mg, 0.4 mg, Oral,  Daily, Jose Mccauley MD, 0.4 mg at 12/30/22 0901    vitamin D 1000 units tablet 5,000 Units, 5,000 Units, Oral, Daily, Jose Mccauley MD, 5,000 Units at 12/30/22 0901    zinc sulfate capsule 220 mg, 220 mg, Oral, Daily, Jose Mccauley MD, 220 mg at 12/30/22 0902         VTE Risk Mitigation (From admission, onward)           Ordered     enoxaparin injection 30 mg  Daily         12/28/22 2315     IP VTE HIGH RISK PATIENT  Once         12/28/22 2315     Place sequential compression device  Until discontinued         12/28/22 2315                    Assessment:   Elevated troponin probably NSTEMI type 2 related to hypoxia   COVID pneumonia  CKD3  Cognitive decline  DM2  HTN  Nonspecific intraventricular heart block      Plan:     DYLON- low risk HEART- low risk  Mildly elevated BNP  TTE shows severely worsened EF 20-25% and possible LV thrombus  Dr. Sanders reviewed with definity studies, no LV thrombus  Elevated troponin peak 0.118 peak likely 2/2 to hypoxemia and anemia  Continue metoprolol succinate 25 mg, aspirin 81 mg daily, and atorvastatin 20 mg  Holding ACE/ARB 2/2 renal dysfunction  Outpatient ischemic workup per Dr. Valdovinos, no acute invention indicated at this time , patient is COVID +  Supplemental Oxygen, wean as tolerated  Monitor electrolytes Mg/P/K, replete > 2/3/4 as needed       Thank you for your consult. We will sign off at this time.    Jack Shirley MD  Cardiology  [facility] - Emergency Dept  12/30/2022 7:47 AM    I have reviewed and concur with the resident's history, physical, assessment, and plan.  I have personally interviewed and examined the patient at bedside.

## 2022-12-30 NOTE — NURSING
Nurses Note -- 4 Eyes      12/30/2022   7:23 AM      Skin assessed during: Admit      [x] No Pressure Injuries Present    []Prevention Measures Documented      [] Yes- Altered Skin Integrity Present or Discovered   [] LDA Added if Not in Epic (Describe Wound)   [] New Altered Skin Integrity was Present on Admit and Documented in LDA   [] Wound Image Taken    Wound Care Consulted? No    Attending Nurse:  Fina Bowers RN     Second RN/Staff Member:  Acosta Nichole

## 2022-12-30 NOTE — PROGRESS NOTES
"Inpatient Nutrition Evaluation    Admit Date: 12/28/2022   Total duration of encounter: 2 days    Nutrition Recommendation/Prescription     - Continue current diet per SLP recs; assist with meals as needed.   - Monitor wt, labs, and intake.     Nutrition Assessment     Chart Review    Reason Seen: continuous nutrition monitoring    Malnutrition Screening Tool Results   Have you recently lost weight without trying?: No  Have you been eating poorly because of a decreased appetite?: No   MST Score: 0     Diagnosis:  Severe COVID-19 pneumonitis   Acute hypoxemic respiratory failure secondary to above   Sirs-sepsis secondary to COVID-19 versus superimposed bacterial infection   Acute on chronic kidney disease, stage IIIB  NSTEMI-possible type 2  Type 2 diabetes mellitus   Essential HTN-stable   Hyperuricemia   Anemia of chronic disease   Prophylaxis    Relevant Medical History:  chronic kidney IIIb, HTN, hyperuricemia, type 2 diabetes mellitus, anemia of chronic disease, cognitive deficit     Nutrition-Related Medications: Vit C, Decadron, SSI, Vit D, Zinc Sulfate    Nutrition-Related Labs:  12/30: Glu 169, GFR 30    Diet Order: Diet Minced & Moist Supervision with Meals; Mildly Thick Liquids  Oral Supplement Order: none  Appetite/Oral Intake: good/% of meals  Factors Affecting Nutritional Intake: none identified  Food/Sikh/Cultural Preferences: none reported  Food Allergies: no known food allergies       Wound(s):   none noted    Comments    12/30: Pt is on oral diet per SLP recs; Per RN, pt is eating very well.     Anthropometrics    Height: 6' 1" (185.4 cm) Height Method: Estimated  Last Weight: 82.1 kg (181 lb) (12/30/22 1227) Weight Method: Stated  BMI (Calculated): 23.9  BMI Classification: normal (BMI 18.5-24.9)        Ideal Body Weight (IBW), Male: 184 lb     % Ideal Body Weight, Male (lb): 98.37 %                          Usual Weight Provided By: EMR weight history    Wt Readings from Last 3 " Encounters:   12/30/22 1227 82.1 kg (181 lb)   12/28/22 1318 82.1 kg (181 lb)      Weight Change(s) Since Admission:  Admit Weight: 82.1 kg (181 lb) (12/28/22 1318)      Patient Education    Not applicable.    Monitoring & Evaluation     Dietitian will monitor energy intake and weight change.  Nutrition Risk/Follow-Up: low (follow-up in 5-7 days)  Patients assigned 'low nutrition risk' status do not qualify for a full nutritional assessment but will be monitored and re-evaluated in a 5-7 day time period. Please consult if re-evaluation needed sooner.

## 2022-12-30 NOTE — PT/OT/SLP EVAL
Speech Language Pathology Department  Clinical Swallow Evaluation    Patient Name:  Caryn Lorenzo   MRN:  [id number]  Admitting Diagnosis: Pneumonia due to COVID-19 virus    Recommendations:     General recommendations:  SLP follow up x1  Diet recommendations:  Minced & Moist Diet - IDDSI Level 5, Liquid Diet Level: Mildly thick liquids - IDDSI Level 2   Swallow strategies/precautions: small bites/sips, slow rate, Supervision with meals, and medications crushed in puree  Precautions: Standard, aspiration    History:     Past Medical History:   Diagnosis Date    BPH (benign prostatic hyperplasia)     Cognitive communication deficit        History reviewed. No pertinent surgical history.    Home Diet: Minced & Moist and mildly thick liquids  Current Method of Nutrition: PO diet regular with thin     Patient complaint: nursing reports coughing during liquid intake     Subjective     Patient awake and alert.    Patient goals: not stated     Spiritual/Cultural/Religion Beliefs/Practices that affect care: no    Pain/Comfort: Pain Rating 1: 0/10    Respiratory Status: nasal canula     Objective:     Oral Musculature Evaluation  Oral Musculature: general weakness  Dentition: scattered dentition  Secretion Management: adequate  Mucosal Quality: good  Mandibular Strength and Mobility: WFL  Oral Labial Strength and Mobility: WFL    Consistency Fed By Oral Symptoms Pharyngeal Symptoms   Mildly thick liquid by cup Self None None   Mildly thick liquid by straw Self None None   Puree SLP None None   Minced & Moist SLP None None     Assessment:     Pt presents from NH with modified diet consisting of minced and moist with mildly thick liquids and meds crushed in puree to reduce the risk of aspiration. Pt safe to resume NH diet. SLP to follow up x1 regarding diet tolerance.    Patient Education:     Patient provided with verbal education regarding plan of care.  Understanding was verbalized.    Plan:     SLP Follow-Up:  Yes   Plan of  Care reviewed with:  patient      Time Tracking:     SLP Treatment Date:   12/30/22  Speech Start Time:  0945  Speech Stop Time:  1000     Speech Total Time (min):  15 min    Billable minutes:  Swallow and Oral Function Evaluation, 15 minutes      12/30/2022

## 2022-12-30 NOTE — PLAN OF CARE
12/30/22 1032   Discharge Assessment   Assessment Type Discharge Planning Assessment   Source of Information health record   Communicated DARIN with patient/caregiver Date not available/Unable to determine   Reason For Admission Shortness of Breath   People in Home facility resident   Facility Arrived From: [facility]   Do you expect to return to your current living situation? Yes   Do you have help at home or someone to help you manage your care at home? Yes   Who are your caregiver(s) and their phone number(s)? Patient is resident at [facility]   Who is going to help you get home at discharge? Nursing home to provide transport at discharge   How do you get to doctors appointments? other (see comments)  (NH provides transport)   Are you on dialysis? No   Do you take coumadin? No   Discharge Plan A Return to nursing home   Discharge Plan B Return to Nursing Home   OTHER   Name(s) of People in Home Patient is resident at [facility]

## 2022-12-30 NOTE — NURSING
Nurses Note -- 4 Eyes      12/29/2022   8:37 PM      Skin assessed during: Admit      [x] No Pressure Injuries Present    [x]Prevention Measures Documented      [] Yes- Altered Skin Integrity Present or Discovered   [] LDA Added if Not in Epic (Describe Wound)   [] New Altered Skin Integrity was Present on Admit and Documented in LDA   [] Wound Image Taken    Wound Care Consulted? No    Attending Nurse:  Namrata Hernandez RN     Second RN/Staff Member: MEÑO Cortes

## 2022-12-31 LAB
ANION GAP SERPL CALC-SCNC: 11 MEQ/L
APTT PPP: 36.1 SECONDS (ref 23.2–33.7)
BACTERIA UR CULT: NO GROWTH
BASOPHILS # BLD AUTO: 0.01 X10(3)/MCL (ref 0–0.2)
BASOPHILS NFR BLD AUTO: 0.1 %
BUN SERPL-MCNC: 65.7 MG/DL (ref 8.4–25.7)
CALCIUM SERPL-MCNC: 8.4 MG/DL (ref 8.8–10)
CHLORIDE SERPL-SCNC: 109 MMOL/L (ref 98–107)
CO2 SERPL-SCNC: 22 MMOL/L (ref 23–31)
CREAT SERPL-MCNC: 1.98 MG/DL (ref 0.73–1.18)
CREAT/UREA NIT SERPL: 33
CRP SERPL-MCNC: 100.6 MG/L
D DIMER PPP IA.FEU-MCNC: 2.96 UG/ML FEU (ref 0–0.5)
EOSINOPHIL # BLD AUTO: 0 X10(3)/MCL (ref 0–0.9)
EOSINOPHIL NFR BLD AUTO: 0 %
ERYTHROCYTE [DISTWIDTH] IN BLOOD BY AUTOMATED COUNT: 15.9 % (ref 11.6–14.4)
GFR SERPLBLD CREATININE-BSD FMLA CKD-EPI: 33 MLS/MIN/1.73/M2
GLUCOSE SERPL-MCNC: 126 MG/DL (ref 82–115)
HCT VFR BLD AUTO: 33.3 % (ref 42–52)
HGB BLD-MCNC: 10.9 GM/DL (ref 14–18)
IMM GRANULOCYTES # BLD AUTO: 0.26 X10(3)/MCL (ref 0–0.04)
IMM GRANULOCYTES NFR BLD AUTO: 2.7 %
LYMPHOCYTES # BLD AUTO: 0.42 X10(3)/MCL (ref 0.6–4.6)
LYMPHOCYTES NFR BLD AUTO: 4.4 %
MCH RBC QN AUTO: 29.7 PG
MCHC RBC AUTO-ENTMCNC: 32.7 MG/DL (ref 33–36)
MCV RBC AUTO: 90.7 FL (ref 80–94)
MONOCYTES # BLD AUTO: 0.34 X10(3)/MCL (ref 0.1–1.3)
MONOCYTES NFR BLD AUTO: 3.6 %
NEUTROPHILS # BLD AUTO: 8.43 X10(3)/MCL (ref 2.1–9.2)
NEUTROPHILS NFR BLD AUTO: 89.2 %
NRBC BLD AUTO-RTO: 0 % (ref 0–1)
PLATELET # BLD AUTO: 197 X10(3)/MCL (ref 140–371)
PMV BLD AUTO: 10 FL (ref 9.4–12.4)
POCT GLUCOSE: 149 MG/DL (ref 70–110)
POCT GLUCOSE: 244 MG/DL (ref 70–110)
POCT GLUCOSE: 273 MG/DL (ref 70–110)
POTASSIUM SERPL-SCNC: 4.7 MMOL/L (ref 3.5–5.1)
RBC # BLD AUTO: 3.67 X10(6)/MCL (ref 4.7–6.1)
SODIUM SERPL-SCNC: 142 MMOL/L (ref 136–145)
WBC # SPEC AUTO: 9.5 X10(3)/MCL (ref 4.5–11.5)

## 2022-12-31 PROCEDURE — 21400001 HC TELEMETRY ROOM

## 2022-12-31 PROCEDURE — 63600175 PHARM REV CODE 636 W HCPCS: Performed by: INTERNAL MEDICINE

## 2022-12-31 PROCEDURE — 27000221 HC OXYGEN, UP TO 24 HOURS

## 2022-12-31 PROCEDURE — 84145 PROCALCITONIN (PCT): CPT | Performed by: INTERNAL MEDICINE

## 2022-12-31 PROCEDURE — 25000003 PHARM REV CODE 250: Performed by: INTERNAL MEDICINE

## 2022-12-31 PROCEDURE — 36415 COLL VENOUS BLD VENIPUNCTURE: CPT | Performed by: INTERNAL MEDICINE

## 2022-12-31 PROCEDURE — 85025 COMPLETE CBC W/AUTO DIFF WBC: CPT | Performed by: INTERNAL MEDICINE

## 2022-12-31 PROCEDURE — 94761 N-INVAS EAR/PLS OXIMETRY MLT: CPT

## 2022-12-31 PROCEDURE — 86140 C-REACTIVE PROTEIN: CPT | Performed by: INTERNAL MEDICINE

## 2022-12-31 PROCEDURE — 27000207 HC ISOLATION

## 2022-12-31 PROCEDURE — 25000242 PHARM REV CODE 250 ALT 637 W/ HCPCS: Performed by: INTERNAL MEDICINE

## 2022-12-31 PROCEDURE — 80048 BASIC METABOLIC PNL TOTAL CA: CPT | Performed by: INTERNAL MEDICINE

## 2022-12-31 PROCEDURE — 85730 THROMBOPLASTIN TIME PARTIAL: CPT | Performed by: INTERNAL MEDICINE

## 2022-12-31 PROCEDURE — 85379 FIBRIN DEGRADATION QUANT: CPT | Performed by: INTERNAL MEDICINE

## 2022-12-31 RX ORDER — HEPARIN SODIUM,PORCINE/D5W 25000/250
0-40 INTRAVENOUS SOLUTION INTRAVENOUS CONTINUOUS
Status: DISCONTINUED | OUTPATIENT
Start: 2023-01-01 | End: 2023-01-02

## 2022-12-31 RX ORDER — HALOPERIDOL 5 MG/ML
5 INJECTION INTRAMUSCULAR EVERY 6 HOURS PRN
Status: DISCONTINUED | OUTPATIENT
Start: 2022-12-31 | End: 2023-01-01

## 2022-12-31 RX ORDER — ENOXAPARIN SODIUM 100 MG/ML
1 INJECTION SUBCUTANEOUS
Status: DISCONTINUED | OUTPATIENT
Start: 2022-12-31 | End: 2022-12-31

## 2022-12-31 RX ORDER — HEPARIN SODIUM 10000 [USP'U]/100ML
17 INJECTION, SOLUTION INTRAVENOUS CONTINUOUS
Status: DISCONTINUED | OUTPATIENT
Start: 2023-01-01 | End: 2022-12-31

## 2022-12-31 RX ADMIN — DOXYCYCLINE HYCLATE 100 MG: 100 TABLET, COATED ORAL at 09:12

## 2022-12-31 RX ADMIN — CHOLECALCIFEROL TAB 25 MCG (1000 UNIT) 5000 UNITS: 25 TAB at 09:12

## 2022-12-31 RX ADMIN — INSULIN ASPART 4 UNITS: 100 INJECTION, SOLUTION INTRAVENOUS; SUBCUTANEOUS at 01:12

## 2022-12-31 RX ADMIN — METOPROLOL SUCCINATE 25 MG: 25 TABLET, EXTENDED RELEASE ORAL at 09:12

## 2022-12-31 RX ADMIN — PIPERACILLIN AND TAZOBACTAM 4.5 G: 4; .5 INJECTION, POWDER, FOR SOLUTION INTRAVENOUS; PARENTERAL at 05:12

## 2022-12-31 RX ADMIN — INSULIN ASPART 3 UNITS: 100 INJECTION, SOLUTION INTRAVENOUS; SUBCUTANEOUS at 03:12

## 2022-12-31 RX ADMIN — FINASTERIDE 5 MG: 5 TABLET, FILM COATED ORAL at 09:12

## 2022-12-31 RX ADMIN — ENOXAPARIN SODIUM 80 MG: 80 INJECTION SUBCUTANEOUS at 03:12

## 2022-12-31 RX ADMIN — ATORVASTATIN CALCIUM 20 MG: 10 TABLET, FILM COATED ORAL at 09:12

## 2022-12-31 RX ADMIN — TAMSULOSIN HYDROCHLORIDE 0.4 MG: 0.4 CAPSULE ORAL at 09:12

## 2022-12-31 RX ADMIN — ZINC SULFATE 220 MG (50 MG) CAPSULE 220 MG: CAPSULE at 09:12

## 2022-12-31 RX ADMIN — Medication 500 MG: at 09:12

## 2022-12-31 RX ADMIN — PIPERACILLIN AND TAZOBACTAM 4.5 G: 4; .5 INJECTION, POWDER, FOR SOLUTION INTRAVENOUS; PARENTERAL at 09:12

## 2022-12-31 RX ADMIN — HALOPERIDOL LACTATE 5 MG: 5 INJECTION, SOLUTION INTRAMUSCULAR at 04:12

## 2022-12-31 RX ADMIN — PIPERACILLIN AND TAZOBACTAM 4.5 G: 4; .5 INJECTION, POWDER, FOR SOLUTION INTRAVENOUS; PARENTERAL at 02:12

## 2022-12-31 RX ADMIN — ASPIRIN 81 MG: 81 TABLET, COATED ORAL at 09:12

## 2022-12-31 RX ADMIN — DEXAMETHASONE 6 MG: 2 TABLET ORAL at 09:12

## 2022-12-31 NOTE — PT/OT/SLP PROGRESS
Speech Language Pathology Department  Diet Tolerance Follow-up    Patient Name:  Caryn Lorenzo   MRN:  [id number]  Admitting Diagnosis: Pneumonia due to COVID-19 virus    Recommendations:     General recommendations:  SLP intervention not indicated  Diet recommendations:  Minced & Moist Diet - IDDSI Level 5, Liquid Diet Level: Mildly thick liquids - IDDSI Level 2   Swallow strategies/precautions: small bites/sips, slow rate, Supervision with meals, and medications crushed in puree  Precautions: Standard, aspiration    Diet tolerance:     Nursing reports no signs of difficulty regarding diet tolerance.    Plan:      No skilled ST intervention is warranted at this time. ST to sign off and please feel free to re consult as appropriate     Time Tracking:     SLP Treatment Date:    12/31/2022

## 2022-12-31 NOTE — PROGRESS NOTES
[facility]  Hospital Medicine Progress Note        Chief Complaint: Inpatient Follow-up for covid    HPI:   82-year-old male with significant if chronic kidney IIIb, HTN, hyperuricemia, type 2 diabetes mellitus, anemia of chronic disease, cognitive deficit presented to the ED from nursing home with complaints of shortness of breath.  Patient was recently diagnosed with COVID-19 infection.  Patient was borderline hypotensive, tachypneic, hypothermic in the ED.  Placed on Gerri Hugger.  Oxygen supplementation given and hypoxemia improved.  Lab significant for severe leukocytosis, mildly elevated troponin which later trended up.  Patient also had worsening renal insufficiency compared to baseline.  COVID-19 PCR positive.  Chest x-ray with prominent interstitial markings.  Patient was initiated on broad-spectrum antimicrobials, IV fluids and was admitted to hospitalist medicine service.  TTE showed worsened EF 20-25% and possible LV thrombus.  Cardiology again reviewed the echo determine there is no LV thrombus.  Recommended to have outpatient ischemic workup,Given COVID and requiring supplemental oxygen.  No life vest due to dementia.  Also recommended to avoid diuretics due to abnormal renal indices.      Interval Hx:   Patient was seen and examined .  Patient is more agitated and confused.  Mittens applied.  Unable to obtain arise.  Labs showed elevated inflammatory markers.  Oxygen requirements remain the same.  DVT in the right lower extremity noted.      Objective/physical exam:  General: In no acute distress, agitated  Chest:  Decreased breath sounds with crackles.   Heart:  Regular S1, S2,   Abdomen: Soft, nontender, BS +  MSK:  Pedal edema left ankle is more swollen than left   Neurologic:  Awake, alert, seems oriented x2 pleasantly confused, moving all extremities    VITAL SIGNS: 24 HRS MIN & MAX LAST   Temp  Min: 97.3 °F (36.3 °C)  Max: 98.8 °F (37.1 °C) 98.8 °F (37.1 °C)   BP   Min: 121/68  Max: 155/84 (!) 155/84   Pulse  Min: 55  Max: 70  66   Resp  Min: 18  Max: 20 20     SpO2  Min: 90 %  Max: 95 % (!) 90 %       Recent Labs   Lab 12/30/22  0423   WBC 16.3*   RBC 3.50*   HGB 10.4*   HCT 31.9*   MCV 91.1   MCH 29.7   MCHC 32.6*   RDW 16.2*      MPV 10.3         Recent Labs   Lab 12/26/22  1345 12/28/22  1217 12/28/22  1229 12/29/22  0452 12/30/22  0423 12/31/22  0451   *   < >  --    < > 138 142   K 4.9   < >  --    < > 4.6 4.7   CO2 22*   < >  --    < > 19* 22*   BUN 53.2*   < >  --    < > 75.3* 65.7*   CREATININE 2.07*   < >  --    < > 2.17* 1.98*   CALCIUM 8.5*   < >  --    < > 8.7* 8.4*   PH  --   --  7.32*  --   --   --    MG 1.70  --   --   --   --   --    ALBUMIN 2.5*   < >  --    < > 1.9*  --    ALKPHOS 110   < >  --    < > 94  --    ALT 41   < >  --    < > 35  --    AST 47*   < >  --    < > 41*  --    BILITOT 0.6   < >  --    < > 0.4  --     < > = values in this interval not displayed.          Microbiology Results (last 7 days)       Procedure Component Value Units Date/Time    Blood Culture #2 **CANNOT BE ORDERED STAT** [[id number]]  (Normal) Collected: 12/28/22 1226    Order Status: Completed Specimen: Blood Updated: 12/31/22 1401     CULTURE, BLOOD ([facility]) No Growth At 72 Hours    Blood Culture #1 **CANNOT BE ORDERED STAT** [[id number]]  (Normal) Collected: 12/28/22 1217    Order Status: Completed Specimen: Blood Updated: 12/31/22 1401     CULTURE, BLOOD ([facility]) No Growth At 72 Hours    Urine culture [[id number]] Collected: 12/29/22 1436    Order Status: Completed Specimen: Urine Updated: 12/31/22 1121     Urine Culture No Growth             Scheduled Med:   ascorbic acid (vitamin C)  500 mg Oral BID    aspirin  81 mg Oral Daily    atorvastatin  20 mg Oral Daily    dexAMETHasone  6 mg Oral Daily    doxycycline  100 mg Oral Q12H    enoxaparin  1 mg/kg Subcutaneous Q12H    finasteride  5 mg Oral Daily    metoprolol succinate  25 mg Oral Daily    mirtazapine  15 mg Oral  QHS    perflutren lipid microspheres  1.3 mL Intravenous Once    piperacillin-tazobactam (ZOSYN) IVPB  4.5 g Intravenous Q8H    QUEtiapine  25 mg Oral QHS    tamsulosin  0.4 mg Oral Daily    vitamin D  5,000 Units Oral Daily    zinc sulfate  220 mg Oral Daily          Assessment/Plan:  Severe COVID-19 pneumonitis   Decompensated Heart failure with reduced ejection fraction  Acute hypoxemic respiratory failure secondary to above   Sirs-sepsis secondary to COVID-19 versus superimposed bacterial infection   Acute right lower extremity DVT  Acute on chronic kidney disease, stage IIIB  NSTEMI-possible type 2  Type 2 diabetes mellitus   Essential HTN-stable   Hyperuricemia   Anemia of chronic disease   Prophylaxis    Chest x-ray with slight worsening of infectious process.  Patient remained on nasal cannula 2 L saturating 93%.    Continue IV antibiotics Zosyn, doxycycline blood cultures negative so far  Continue dexamethasone 6 mg  Anticoagulation started with lovenox , given renal function will switch to heparin drip and for easy reversal if any signs of bleeding  Discussed with pharmacy.  Given improvement in renal function we will start the patient on remdesivir.    Continue supplemental oxygen to keep sats above 92.    Please send sputum for MRSA PCR   Continue strict aspiration precautions   Appreciate cardiology recommendations.Continue aspirin, beta-blockers , unable to diurese given renal function.planning for outpatient ischemic evaluation . Echo showed severely decreased EF 20-25%.  Avoid nephrotoxins  Blood sugars elevated likely from steroid use A1c 5.6 12/16/22.  Monitor blood sugars, continue Sliding scale for diabetes mellitus  Resumed other home meds-finasteride, Remeron, Seroquel, Flomax, vitamin-D   Daily CBC, CMP, CRP, D-dimer     Discussed with son Mr. Jose Lorenzo who is POA regarding goals of care.   Code status : DNI     DVT prophylaxis-Lovenox     Critical care time spent:  50 minutes   Critical  care diagnosis:  Sepsis Acute hypoxic respiratory failure, COVID, congestive heart failure    Deangelo Cabello MD   12/31/2022

## 2022-12-31 NOTE — PROGRESS NOTES
[facility] - Emergency Dept  Cardiology  Consult Note    Patient Name: Caryn Lorenzo  MRN: [id number]  Admission Date: 12/28/2022  Hospital Length of Stay: 3 days  Code Status: Full Code   Attending Provider: Jose Mccauley MD   Consulting Provider: MARION Nielson  Primary Care Physician: Primary Doctor No  Principal Problem:Pneumonia due to COVID-19 virus    Patient information was obtained from patient, relative(s), and ER records.     Subjective:     Chief Complaint:  shortness of breath     HPI:   Ms. Lorenzo is an is 83 y/o female, known to [facility] through hospital consult only, with PMHx significant for Bradycardia/Mobitz I and II, LBBB, CKD3, anemia of chronic disease, DM2, HTN, and gout who presented to [facility] IM service 2/2 shortness of breath and hypotension, found to be covid positive, hypoxic on 3 liters with minimally elevated troponin.  Interval Hx: NAEON. Patient overall feeling well aside from persistent cough. No chest pain, palpitations, lower extremity edema    Hospital Course:  12/31/22: Reconsulted by IM to determine if diuretics needed. Renal indices remains mildly improved. CXR did not show significant evidence of fluid overload.       PMH: Anemia of chronic disease, T2DM, HTN, gout, CKD 3, Bradycardia, Mobitz type I and Type II, LBBB  PSH: denies   Family History: n/a  Social History: lives [facility]    Previous Cardiac Diagnostics:   TTE 12/29/22:  The estimated ejection fraction is 20-25%; Definity used. Anterior and anteroseptal wall akinesis. There is possible LV thrombus attached to apicoseptal wall. The estimated PA systolic pressure is 34 mmHg. Recommend repeat limited study with adequate contrast to look for LV thrombus if clinically indicated.** Dr. Sanders reviewed Definity images, no LV thrombus noted**       Past Medical History:   Diagnosis Date    BPH (benign prostatic hyperplasia)     Cognitive communication deficit        History reviewed. No  pertinent surgical history.    Review of patient's allergies indicates:  No Known Allergies    No current facility-administered medications on file prior to encounter.     Current Outpatient Medications on File Prior to Encounter   Medication Sig    amLODIPine (NORVASC) 2.5 MG tablet Take 2.5 mg by mouth once daily.    ascorbic acid, vitamin C, (VITAMIN C) 500 MG tablet Take 500 mg by mouth 2 (two) times daily. Stop date: 01/08/23    benazepriL (LOTENSIN) 10 MG tablet Take 10 mg by mouth once daily.    dexAMETHasone (DECADRON) 6 MG tablet Take 6 mg by mouth every evening. Stop date: 01/04/23    finasteride (PROSCAR) 5 mg tablet once daily.    pioglitazone (ACTOS) 15 MG tablet Take 15 mg by mouth once daily.    potassium chloride (MICRO-K) 10 MEQ CpSR Take 20 mEq by mouth once.    tamsulosin (FLOMAX) 0.4 mg Cap Take 0.4 mg by mouth once daily.    vitamin D (VITAMIN D3) 1000 units Tab Take 5,000 Units by mouth once daily.    zinc sulfate (ZINCATE) 50 mg zinc (220 mg) capsule Take 220 mg by mouth once daily.    furosemide (LASIX) 40 MG tablet     mirtazapine (REMERON) 15 MG tablet     QUEtiapine (SEROQUEL) 25 MG Tab Take 25 mg by mouth.     Family History    None       Tobacco Use    Smoking status: Not on file    Smokeless tobacco: Not on file   Substance and Sexual Activity    Alcohol use: Not on file    Drug use: Not on file    Sexual activity: Not on file       Review of Systems   Constitutional:  Positive for fatigue. Negative for chills and fever.   HENT:  Positive for congestion and sore throat. Negative for trouble swallowing and voice change.    Eyes:  Negative for visual disturbance.   Respiratory:  Positive for cough, shortness of breath and wheezing.    Cardiovascular:  Negative for chest pain, palpitations and leg swelling.   Gastrointestinal:  Negative for abdominal pain, nausea and vomiting.   Genitourinary:  Negative for dysuria.   Musculoskeletal: Negative.    Skin: Negative.    Neurological: Negative.       Objective:     Vital Signs (Most Recent):  Temp: 98.8 °F (37.1 °C) (12/31/22 1019)  Pulse: (!) 56 (12/31/22 1019)  Resp: 20 (12/31/22 1019)  BP: 121/68 (12/31/22 1019)  SpO2: (!) 90 % (12/31/22 1019)   Vital Signs (24h Range):  Temp:  [97.3 °F (36.3 °C)-98.8 °F (37.1 °C)] 98.8 °F (37.1 °C)  Pulse:  [55-70] 56  Resp:  [18-20] 20  SpO2:  [90 %-96 %] 90 %  BP: (113-140)/(54-70) 121/68     Weight: 82.1 kg (181 lb)  Body mass index is 23.88 kg/m².    SpO2: (!) 90 %         Intake/Output Summary (Last 24 hours) at 12/31/2022 1037  Last data filed at 12/31/2022 0500  Gross per 24 hour   Intake 300 ml   Output 1000 ml   Net -700 ml         Lines/Drains/Airways       Peripheral Intravenous Line  Duration                  Peripheral IV - Single Lumen 24 G Anterior;Distal;Left Forearm -- days         Peripheral IV - Single Lumen 12/28/22 1206 20 G Right Forearm 2 days                    Significant Labs:  Recent Results (from the past 72 hour(s))   Comprehensive metabolic panel    Collection Time: 12/28/22 12:17 PM   Result Value Ref Range    Sodium Level 133 (L) 136 - 145 mmol/L    Potassium Level 4.6 3.5 - 5.1 mmol/L    Chloride 101 98 - 107 mmol/L    Carbon Dioxide 20 (L) 23 - 31 mmol/L    Glucose Level 271 (H) 82 - 115 mg/dL    Blood Urea Nitrogen 78.3 (H) 8.4 - 25.7 mg/dL    Creatinine 2.95 (H) 0.73 - 1.18 mg/dL    Calcium Level Total 9.0 8.8 - 10.0 mg/dL    Protein Total 7.0 5.8 - 7.6 gm/dL    Albumin Level 2.2 (L) 3.4 - 4.8 g/dL    Globulin 4.8 (H) 2.4 - 3.5 gm/dL    Albumin/Globulin Ratio 0.5 (L) 1.1 - 2.0 ratio    Bilirubin Total 0.5 <=1.5 mg/dL    Alkaline Phosphatase 134 40 - 150 unit/L    Alanine Aminotransferase 34 0 - 55 unit/L    Aspartate Aminotransferase 50 (H) 5 - 34 unit/L    eGFR 21 mls/min/1.73/m2   Blood Culture #1 **CANNOT BE ORDERED STAT**    Collection Time: 12/28/22 12:17 PM    Specimen: Blood   Result Value Ref Range    CULTURE, BLOOD ([facility]) No Growth At 48 Hours    Lactic acid, plasma     Collection Time: 12/28/22 12:17 PM   Result Value Ref Range    Lactic Acid Level 1.5 0.5 - 2.2 mmol/L   Troponin I    Collection Time: 12/28/22 12:17 PM   Result Value Ref Range    Troponin-I 0.068 (H) 0.000 - 0.045 ng/mL   Brain natriuretic peptide    Collection Time: 12/28/22 12:17 PM   Result Value Ref Range    Natriuretic Peptide 213.0 (H) <=100.0 pg/mL   CBC with Differential    Collection Time: 12/28/22 12:17 PM   Result Value Ref Range    WBC 21.3 (H) 4.5 - 11.5 x10(3)/mcL    RBC 3.57 (L) 4.70 - 6.10 x10(6)/mcL    Hgb 10.7 (L) 14.0 - 18.0 gm/dL    Hct 32.2 (L) 42.0 - 52.0 %    MCV 90.2 80.0 - 94.0 fL    MCH 30.0 pg    MCHC 33.2 33.0 - 36.0 mg/dL    RDW 15.9 (H) 11.6 - 14.4 %    Platelet 203 140 - 371 x10(3)/mcL    MPV 9.7 9.4 - 12.4 fL    IG# 0.18 (H) 0 - 0.04 x10(3)/mcL    IG% 0.8 %    NRBC% 0.0 0 - 1 %   Manual Differential    Collection Time: 12/28/22 12:17 PM   Result Value Ref Range    Neut Man 93 %    Lymph Man 3 %    Monocyte Man 4 %    Instr WBC 21 x10(3)/mcL    Abs Mono 0.84 0.1 - 1.3 x10(3)/mcL    Abs Lymp 0.63 0.6 - 4.6 x10(3)/mcL    Abs Neut 19.53 (H) 2.1 - 9.2 x10(3)/mcL    RBC Morph Abnormal (A) Normal    Poik 2+ (A) (none)    Lynne Cells 2+ (A) (none)    Platelet Est Normal Normal, Adequate   Blood Culture #2 **CANNOT BE ORDERED STAT**    Collection Time: 12/28/22 12:26 PM    Specimen: Blood   Result Value Ref Range    CULTURE, BLOOD ([facility]) No Growth At 48 Hours    POCT ARTERIAL BLOOD GAS    Collection Time: 12/28/22 12:29 PM   Result Value Ref Range    POC PH 7.32 (A) 7.35 - 7.45    POC PCO2 40 35 - 45 mmHg    POC PO2 66 (A) 80 - 100 mmHg    POC HEMOGLOBIN 10.5 (A) 12.0 - 16.0 g/dL    POC SATURATED O2 90.9 %    POC O2Hb 92.0 (A) 94.0 - 97.0 %    POC COHb 2.5 %    POC MetHb 0.60 0.40 - 1.5 %    POC Potassium 4.3 3.5 - 5.0 mmol/l    POC Sodium 129 (A) 137 - 145 mmol/l    POC Ionized Calcium 1.15 1.12 - 1.23 mmol/l    Correct Temperature (PH) 7.32 (A) 7.35 - 7.45    Corrected Temperature (pCO2) 40  35 - 45 mmHg    Corrected Temperature (pO2) 66 (A) 80 - 100 mmHg    POC HCO3 20.6 (A) 22.0 - 26.0 mmol/l    Base Deficit -5.1 (A) -2.0 - 2.0 mmol/l    POC Temp 37.0 °C    Specimen source Arterial sample    COVID/FLU A&B PCR    Collection Time: 12/28/22  9:54 PM   Result Value Ref Range    Influenza A PCR Not Detected Not Detected    Influenza B PCR Not Detected Not Detected    SARS-CoV-2 PCR Detected (A) Not Detected   Troponin I    Collection Time: 12/29/22 12:06 AM   Result Value Ref Range    Troponin-I 0.047 (H) 0.000 - 0.045 ng/mL   Comprehensive metabolic panel    Collection Time: 12/29/22  4:52 AM   Result Value Ref Range    Sodium Level 134 (L) 136 - 145 mmol/L    Potassium Level 4.7 3.5 - 5.1 mmol/L    Chloride 105 98 - 107 mmol/L    Carbon Dioxide 18 (L) 23 - 31 mmol/L    Glucose Level 173 (H) 82 - 115 mg/dL    Blood Urea Nitrogen 73.0 (H) 8.4 - 25.7 mg/dL    Creatinine 2.37 (H) 0.73 - 1.18 mg/dL    Calcium Level Total 7.9 (L) 8.8 - 10.0 mg/dL    Protein Total 5.7 (L) 5.8 - 7.6 gm/dL    Albumin Level 2.0 (L) 3.4 - 4.8 g/dL    Globulin 3.7 (H) 2.4 - 3.5 gm/dL    Albumin/Globulin Ratio 0.5 (L) 1.1 - 2.0 ratio    Bilirubin Total 0.4 <=1.5 mg/dL    Alkaline Phosphatase 107 40 - 150 unit/L    Alanine Aminotransferase 42 0 - 55 unit/L    Aspartate Aminotransferase 69 (H) 5 - 34 unit/L    eGFR 27 mls/min/1.73/m2   Troponin I    Collection Time: 12/29/22  4:52 AM   Result Value Ref Range    Troponin-I 0.118 (H) 0.000 - 0.045 ng/mL   CBC with Differential    Collection Time: 12/29/22  4:52 AM   Result Value Ref Range    WBC 20.6 (H) 4.5 - 11.5 x10(3)/mcL    RBC 3.31 (L) 4.70 - 6.10 x10(6)/mcL    Hgb 10.0 (L) 14.0 - 18.0 gm/dL    Hct 29.9 (L) 42.0 - 52.0 %    MCV 90.3 80.0 - 94.0 fL    MCH 30.2 pg    MCHC 33.4 33.0 - 36.0 mg/dL    RDW 15.9 (H) 11.6 - 14.4 %    Platelet 190 140 - 371 x10(3)/mcL    MPV 10.1 9.4 - 12.4 fL    Neut % 93.6 %    Lymph % 3.3 %    Mono % 1.9 %    Eos % 0.0 %    Basophil % 0.4 %    Lymph #  0.69 0.6 - 4.6 x10(3)/mcL    Neut # 19.26 (H) 2.1 - 9.2 x10(3)/mcL    Mono # 0.39 0.1 - 1.3 x10(3)/mcL    Eos # 0.00 0 - 0.9 x10(3)/mcL    Baso # 0.09 0 - 0.2 x10(3)/mcL    IG# 0.17 (H) 0 - 0.04 x10(3)/mcL    IG% 0.8 %    NRBC% 0.0 0 - 1 %   POCT glucose    Collection Time: 12/29/22  6:19 AM   Result Value Ref Range    POCT Glucose 147 (H) 70 - 110 mg/dL   Troponin I    Collection Time: 12/29/22 11:07 AM   Result Value Ref Range    Troponin-I 0.040 0.000 - 0.045 ng/mL   VANCOMYCIN, TROUGH    Collection Time: 12/29/22 11:31 AM   Result Value Ref Range    Vancomycin Trough 14.1 (L) 15.0 - 20.0 ug/ml   Echo    Collection Time: 12/29/22 11:33 AM   Result Value Ref Range    TDI SEPTAL 0.03 m/s    LV LATERAL E/E' RATIO 11.50 m/s    LV SEPTAL E/E' RATIO 15.33 m/s    Right Atrial Pressure (from IVC) 3 mmHg    EF 23 %    Left Ventricular Outflow Tract Mean Velocity 0.55 cm/s    Left Ventricular Outflow Tract Mean Gradient 1.00 mmHg    TDI LATERAL 0.04 m/s    PV PEAK VELOCITY 0.91 cm/s    LVIDd 5.57 3.5 - 6.0 cm    IVS 1.46 (A) 0.6 - 1.1 cm    Posterior Wall 1.36 (A) 0.6 - 1.1 cm    LVIDs 4.56 (A) 2.1 - 4.0 cm    FS 18 28 - 44 %    LV mass 348.19 g    LA size 4.00 cm    TAPSE 2.17 cm    Left Ventricle Relative Wall Thickness 0.49 cm    AV mean gradient 2 mmHg    AV valve area 2.75 cm2    AV Velocity Ratio 0.77     AV index (prosthetic) 0.88     MV mean gradient 2 mmHg    MV valve area p 1/2 method 6.11 cm2    MV valve area by continuity eq 2.56 cm2    E/A ratio 0.53     Mean e' 0.04 m/s    E wave deceleration time 259.00 msec    LVOT diameter 2.00 cm    LVOT area 3.1 cm2    LVOT peak mark 0.79 m/s    LVOT peak VTI 19.80 cm    Ao peak mark 1.03 m/s    Ao VTI 22.6 cm    LVOT stroke volume 62.17 cm3    AV peak gradient 4 mmHg    MV peak gradient 4 mmHg    TV rest pulmonary artery pressure 34 mmHg    E/E' ratio 13.14 m/s    MV Peak E Mark 0.46 m/s    TR Max Mark 2.79 m/s    MV VTI 24.3 cm    MV stenosis pressure 1/2 time 36.00 ms     MV Peak A Mark 0.87 m/s    LV Systolic Volume 95.40 mL    LV Diastolic Volume 152.00 mL    Triscuspid Valve Regurgitation Peak Gradient 31 mmHg   Urinalysis, Reflex to Urine Culture Urine, Clean Catch    Collection Time: 12/29/22  2:36 PM    Specimen: Urine   Result Value Ref Range    Color, UA Yellow Yellow, Light-Yellow, Dark Yellow, Marielle, Straw    Appearance, UA Cloudy (A) Clear    Specific Gravity, UA 1.009 1.001 - 1.030    pH, UA 7.5 5.0 - 8.5    Protein, UA Trace (A) Negative mg/dL    Glucose, UA Negative Negative, Normal mg/dL    Ketones, UA Negative Negative mg/dL    Blood, UA 1+ (A) Negative unit/L    Bilirubin, UA Negative Negative mg/dL    Urobilinogen, UA 0.2 0.2, 1.0, Normal mg/dL    Nitrites, UA Negative Negative    Leukocyte Esterase, UA 3+ (A) Negative unit/L   Urinalysis, Microscopic    Collection Time: 12/29/22  2:36 PM   Result Value Ref Range    RBC, UA 11 (H) <=5 /HPF    WBC,  (H) <=5 /HPF    Squamous Epithelial Cells, UA <5 <=5 /HPF    Bacteria, UA None Seen None Seen, Rare, Occasional /HPF   Urine culture    Collection Time: 12/29/22  2:36 PM    Specimen: Urine   Result Value Ref Range    Urine Culture No Growth At 24 Hours    POCT glucose    Collection Time: 12/29/22 11:48 PM   Result Value Ref Range    POCT Glucose 190 (H) 70 - 110 mg/dL   Comprehensive Metabolic Panel    Collection Time: 12/30/22  4:23 AM   Result Value Ref Range    Sodium Level 138 136 - 145 mmol/L    Potassium Level 4.6 3.5 - 5.1 mmol/L    Chloride 108 (H) 98 - 107 mmol/L    Carbon Dioxide 19 (L) 23 - 31 mmol/L    Glucose Level 169 (H) 82 - 115 mg/dL    Blood Urea Nitrogen 75.3 (H) 8.4 - 25.7 mg/dL    Creatinine 2.17 (H) 0.73 - 1.18 mg/dL    Calcium Level Total 8.7 (L) 8.8 - 10.0 mg/dL    Protein Total 6.2 5.8 - 7.6 gm/dL    Albumin Level 1.9 (L) 3.4 - 4.8 g/dL    Globulin 4.3 (H) 2.4 - 3.5 gm/dL    Albumin/Globulin Ratio 0.4 (L) 1.1 - 2.0 ratio    Bilirubin Total 0.4 <=1.5 mg/dL    Alkaline Phosphatase 94 40 - 150  unit/L    Alanine Aminotransferase 35 0 - 55 unit/L    Aspartate Aminotransferase 41 (H) 5 - 34 unit/L    eGFR 30 mls/min/1.73/m2   CBC with Differential    Collection Time: 12/30/22  4:23 AM   Result Value Ref Range    WBC 16.3 (H) 4.5 - 11.5 x10(3)/mcL    RBC 3.50 (L) 4.70 - 6.10 x10(6)/mcL    Hgb 10.4 (L) 14.0 - 18.0 gm/dL    Hct 31.9 (L) 42.0 - 52.0 %    MCV 91.1 80.0 - 94.0 fL    MCH 29.7 pg    MCHC 32.6 (L) 33.0 - 36.0 mg/dL    RDW 16.2 (H) 11.6 - 14.4 %    Platelet 186 140 - 371 x10(3)/mcL    MPV 10.3 9.4 - 12.4 fL    Neut % 93.3 %    Lymph % 2.8 %    Mono % 2.8 %    Eos % 0.0 %    Basophil % 0.2 %    Lymph # 0.46 (L) 0.6 - 4.6 x10(3)/mcL    Neut # 15.17 (H) 2.1 - 9.2 x10(3)/mcL    Mono # 0.45 0.1 - 1.3 x10(3)/mcL    Eos # 0.00 0 - 0.9 x10(3)/mcL    Baso # 0.03 0 - 0.2 x10(3)/mcL    IG# 0.15 (H) 0 - 0.04 x10(3)/mcL    IG% 0.9 %    NRBC% 0.0 0 - 1 %   POCT glucose    Collection Time: 12/30/22  6:11 AM   Result Value Ref Range    POCT Glucose 197 (H) 70 - 110 mg/dL   POCT glucose    Collection Time: 12/30/22 11:37 AM   Result Value Ref Range    POCT Glucose 342 (H) 70 - 110 mg/dL   POCT glucose    Collection Time: 12/30/22  8:36 PM   Result Value Ref Range    POCT Glucose 188 (H) 70 - 110 mg/dL   C-Reactive Protein    Collection Time: 12/31/22  4:51 AM   Result Value Ref Range    C-Reactive Protein 100.60 (H) <5.00 mg/L   D-Dimer, Quantitative    Collection Time: 12/31/22  4:51 AM   Result Value Ref Range    D-Dimer 2.96 (H) 0.00 - 0.50 ug/mL FEU   Basic Metabolic Panel    Collection Time: 12/31/22  4:51 AM   Result Value Ref Range    Sodium Level 142 136 - 145 mmol/L    Potassium Level 4.7 3.5 - 5.1 mmol/L    Chloride 109 (H) 98 - 107 mmol/L    Carbon Dioxide 22 (L) 23 - 31 mmol/L    Glucose Level 126 (H) 82 - 115 mg/dL    Blood Urea Nitrogen 65.7 (H) 8.4 - 25.7 mg/dL    Creatinine 1.98 (H) 0.73 - 1.18 mg/dL    BUN/Creatinine Ratio 33     Calcium Level Total 8.4 (L) 8.8 - 10.0 mg/dL    Anion Gap 11.0 mEq/L     eGFR 33 mls/min/1.73/m2       Significant Imaging:  Imaging Results              X-Ray Chest 1 View (Final result)  Result time 12/28/22 13:21:09      Final result by Cm Roberson MD (12/28/22 13:21:09)                   Impression:      Prominent interstitial markings with no focal opacification.  No prior imaging available for comparison.  Developing infectious process is not entirely excluded.      Electronically signed by: Cm Roberson  Date:    12/28/2022  Time:    13:21               Narrative:    EXAMINATION:  XR CHEST 1 VIEW    CLINICAL HISTORY:  Pneumonia, unspecified organism    TECHNIQUE:  Single view of the chest    COMPARISON:  No prior imaging available for comparison.    FINDINGS:  Prominent interstitial markings with no focal opacification.    The cardiomediastinal silhouette is within normal limits.    No acute osseous abnormality.                                      EKG:    Results for orders placed or performed during the hospital encounter of 12/28/22   EKG 12-lead    Narrative    Test Reason : R06.02,    Vent. Rate : 063 BPM     Atrial Rate : 063 BPM     P-R Int : 202 ms          QRS Dur : 126 ms      QT Int : 444 ms       P-R-T Axes : 053 078 011 degrees     QTc Int : 454 ms    Sinus rhythm with occasional Premature ventricular complexes  Nonspecific intraventricular block  Abnormal ECG  No previous ECGs available  Confirmed by Maninder Laboy MD ([id number]) on 12/29/2022 12:03:21 AM    Referred By: [email or web address]   SELF           Confirmed By:Maninder Laboy MD     Physical Exam  Deferred due to covid  Home Medications:   No current facility-administered medications on file prior to encounter.     Current Outpatient Medications on File Prior to Encounter   Medication Sig Dispense Refill    amLODIPine (NORVASC) 2.5 MG tablet Take 2.5 mg by mouth once daily.      ascorbic acid, vitamin C, (VITAMIN C) 500 MG tablet Take 500 mg by mouth 2 (two) times daily. Stop date: 01/08/23      benazepriL (LOTENSIN) 10  MG tablet Take 10 mg by mouth once daily.      dexAMETHasone (DECADRON) 6 MG tablet Take 6 mg by mouth every evening. Stop date: 01/04/23      finasteride (PROSCAR) 5 mg tablet once daily.      pioglitazone (ACTOS) 15 MG tablet Take 15 mg by mouth once daily.      potassium chloride (MICRO-K) 10 MEQ CpSR Take 20 mEq by mouth once.      tamsulosin (FLOMAX) 0.4 mg Cap Take 0.4 mg by mouth once daily.      vitamin D (VITAMIN D3) 1000 units Tab Take 5,000 Units by mouth once daily.      zinc sulfate (ZINCATE) 50 mg zinc (220 mg) capsule Take 220 mg by mouth once daily.      furosemide (LASIX) 40 MG tablet       mirtazapine (REMERON) 15 MG tablet       QUEtiapine (SEROQUEL) 25 MG Tab Take 25 mg by mouth.         Current Inpatient Medications:    Current Facility-Administered Medications:     ascorbic acid (vitamin C) tablet 500 mg, 500 mg, Oral, BID, Jose Mccauley MD, 500 mg at 12/31/22 0953    aspirin EC tablet 81 mg, 81 mg, Oral, Daily, Chula Solis MD, 81 mg at 12/31/22 0953    atorvastatin tablet 20 mg, 20 mg, Oral, Daily, Chula Solis MD, 20 mg at 12/31/22 0953    dexAMETHasone tablet 6 mg, 6 mg, Oral, Daily, Jose Mccauley MD, 6 mg at 12/31/22 0954    dextrose 10% bolus 125 mL 125 mL, 12.5 g, Intravenous, PRN, Jose Mccauley MD    dextrose 10% bolus 250 mL 250 mL, 25 g, Intravenous, PRN, Jose Mccauley MD    doxycycline tablet 100 mg, 100 mg, Oral, Q12H, Chula Solis MD, 100 mg at 12/31/22 0900    enoxaparin injection 30 mg, 30 mg, Subcutaneous, Daily, Jose Mccauley MD, 30 mg at 12/30/22 1700    finasteride tablet 5 mg, 5 mg, Oral, Daily, Jose Mccauley MD, 5 mg at 12/31/22 0953    glucagon (human recombinant) injection 1 mg, 1 mg, Intramuscular, PRN, Jose Mccauley MD    glucose chewable tablet 16 g, 16 g, Oral, PRN, Jose Mccauley MD    glucose chewable tablet 24 g, 24 g, Oral, PRN, Jose Mccauley MD    guaiFENesin 100 mg/5 ml syrup 200 mg,  200 mg, Oral, Q4H PRN, Deangelo Cabello MD    insulin aspart U-100 injection 0-5 Units, 0-5 Units, Subcutaneous, QID (AC + HS) PRN, Jose Mccauley MD    insulin aspart U-100 injection 1-10 Units, 1-10 Units, Subcutaneous, QID (AC + HS) PRN, Jose Mccauley MD, 2 Units at 12/30/22 2047    melatonin tablet 6 mg, 6 mg, Oral, Nightly PRN, Jose Mccauley MD, 6 mg at 12/29/22 2206    metoprolol succinate (TOPROL-XL) 24 hr tablet 25 mg, 25 mg, Oral, Daily, Chula Solis MD, 25 mg at 12/31/22 0953    mirtazapine tablet 15 mg, 15 mg, Oral, QHS, Jose Mccauley MD, 15 mg at 12/30/22 2037    perflutren lipid microspheres injection 1.3 mL, 1.3 mL, Intravenous, Once, Jose Mccauley MD    piperacillin-tazobactam (ZOSYN) 4.5 g in dextrose 5 % in water (D5W) 5 % 100 mL IVPB (MB+), 4.5 g, Intravenous, Q8H, oJse Mccauley MD, Last Rate: 25 mL/hr at 12/31/22 0954, 4.5 g at 12/31/22 0954    QUEtiapine tablet 25 mg, 25 mg, Oral, QHS, Jose Mccauley MD, 25 mg at 12/30/22 2036    sodium chloride 0.9% flush 10 mL, 10 mL, Intravenous, PRN, Jose Mccauley MD    tamsulosin 24 hr capsule 0.4 mg, 0.4 mg, Oral, Daily, Jose Mccauley MD, 0.4 mg at 12/31/22 0954    vitamin D 1000 units tablet 5,000 Units, 5,000 Units, Oral, Daily, Jose Mccauley MD, 5,000 Units at 12/31/22 0954    zinc sulfate capsule 220 mg, 220 mg, Oral, Daily, Jose Mccauley MD, 220 mg at 12/31/22 0954         VTE Risk Mitigation (From admission, onward)           Ordered     enoxaparin injection 30 mg  Daily         12/28/22 2315     IP VTE HIGH RISK PATIENT  Once         12/28/22 2315     Place sequential compression device  Until discontinued         12/28/22 2315                    Assessment:   Elevated troponin probably NSTEMI type 2 related to hypoxia/CHF  COVID pneumonia  CMO, unspecified  --EF 20-25%  CKD3  DM2  HTN  Cognitive decline  Hx bradycardia with intermittent episodes of Mobitz I and  II  --none noted on tele review since current admit    Plan:   Plan for outpatient ischemic workup with Dr. Valdovinos. No acute intervention needed at this time.  No lifevest due to dementia  Continue aspirin, Metoprolol succinate, and atorvastatin  Continue CHF GDMT- Metoprolol succinate 25 mg daily (no further uptitration due to bradycardia at times). Avoid ACEi/ARB/ARNI due to impaired renal indices. May add hydralazine 25 mg bid prior to DC if BP tolerates.   Would avoid diuretics currently due to abnormal renal indices and CXR did not show significant evidence of fluid overload      [facility] signing off. Reconsult if needed.     Adilia Medina, MARION  Cardiology  [facility] -   12/31/2022

## 2023-01-01 LAB
ALBUMIN SERPL-MCNC: 1.9 G/DL (ref 3.4–4.8)
ALBUMIN/GLOB SERPL: 0.5 RATIO (ref 1.1–2)
ALP SERPL-CCNC: 79 UNIT/L (ref 40–150)
ALT SERPL-CCNC: 25 UNIT/L (ref 0–55)
APTT PPP: 168.6 SECONDS (ref 23.2–33.7)
APTT PPP: 34.2 SECONDS (ref 23.2–33.7)
AST SERPL-CCNC: 20 UNIT/L (ref 5–34)
BASOPHILS # BLD AUTO: 0.01 X10(3)/MCL (ref 0–0.2)
BASOPHILS NFR BLD AUTO: 0.1 %
BILIRUBIN DIRECT+TOT PNL SERPL-MCNC: 0.5 MG/DL
BUN SERPL-MCNC: 67.5 MG/DL (ref 8.4–25.7)
CALCIUM SERPL-MCNC: 8.3 MG/DL (ref 8.8–10)
CHLORIDE SERPL-SCNC: 112 MMOL/L (ref 98–107)
CO2 SERPL-SCNC: 20 MMOL/L (ref 23–31)
CREAT SERPL-MCNC: 1.8 MG/DL (ref 0.73–1.18)
CRP SERPL-MCNC: 114.3 MG/L
D DIMER PPP IA.FEU-MCNC: 2.15 UG/ML FEU (ref 0–0.5)
EOSINOPHIL # BLD AUTO: 0 X10(3)/MCL (ref 0–0.9)
EOSINOPHIL NFR BLD AUTO: 0 %
ERYTHROCYTE [DISTWIDTH] IN BLOOD BY AUTOMATED COUNT: 16 % (ref 11.6–14.4)
FECAL LEUKOCYTE (OHS): NEGATIVE
GFR SERPLBLD CREATININE-BSD FMLA CKD-EPI: 37 MLS/MIN/1.73/M2
GLOBULIN SER-MCNC: 3.6 GM/DL (ref 2.4–3.5)
GLUCOSE SERPL-MCNC: 121 MG/DL (ref 82–115)
HCT VFR BLD AUTO: 33 % (ref 42–52)
HGB BLD-MCNC: 10.7 GM/DL (ref 14–18)
IMM GRANULOCYTES # BLD AUTO: 0.24 X10(3)/MCL (ref 0–0.04)
IMM GRANULOCYTES NFR BLD AUTO: 3.4 %
LEUKO NEG CONT (OHS): NEGATIVE
LEUKO POS CONT (OHS): POSITIVE
LYMPHOCYTES # BLD AUTO: 0.43 X10(3)/MCL (ref 0.6–4.6)
LYMPHOCYTES NFR BLD AUTO: 6.1 %
MCH RBC QN AUTO: 29.6 PG
MCHC RBC AUTO-ENTMCNC: 32.4 MG/DL (ref 33–36)
MCV RBC AUTO: 91.4 FL (ref 80–94)
MONOCYTES # BLD AUTO: 0.36 X10(3)/MCL (ref 0.1–1.3)
MONOCYTES NFR BLD AUTO: 5.1 %
NEUTROPHILS # BLD AUTO: 5.96 X10(3)/MCL (ref 2.1–9.2)
NEUTROPHILS NFR BLD AUTO: 85.3 %
NRBC BLD AUTO-RTO: 0 % (ref 0–1)
PLATELET # BLD AUTO: 182 X10(3)/MCL (ref 140–371)
PMV BLD AUTO: 10.4 FL (ref 9.4–12.4)
POCT GLUCOSE: 156 MG/DL (ref 70–110)
POCT GLUCOSE: 167 MG/DL (ref 70–110)
POCT GLUCOSE: 181 MG/DL (ref 70–110)
POCT GLUCOSE: 203 MG/DL (ref 70–110)
POTASSIUM SERPL-SCNC: 4.4 MMOL/L (ref 3.5–5.1)
PROT SERPL-MCNC: 5.5 GM/DL (ref 5.8–7.6)
RBC # BLD AUTO: 3.61 X10(6)/MCL (ref 4.7–6.1)
SODIUM SERPL-SCNC: 145 MMOL/L (ref 136–145)
WBC # SPEC AUTO: 7 X10(3)/MCL (ref 4.5–11.5)

## 2023-01-01 PROCEDURE — 25000003 PHARM REV CODE 250: Performed by: INTERNAL MEDICINE

## 2023-01-01 PROCEDURE — 85730 THROMBOPLASTIN TIME PARTIAL: CPT | Performed by: INTERNAL MEDICINE

## 2023-01-01 PROCEDURE — 21400001 HC TELEMETRY ROOM

## 2023-01-01 PROCEDURE — 27000221 HC OXYGEN, UP TO 24 HOURS

## 2023-01-01 PROCEDURE — 36415 COLL VENOUS BLD VENIPUNCTURE: CPT | Performed by: INTERNAL MEDICINE

## 2023-01-01 PROCEDURE — 86140 C-REACTIVE PROTEIN: CPT | Performed by: INTERNAL MEDICINE

## 2023-01-01 PROCEDURE — 87045 FECES CULTURE AEROBIC BACT: CPT | Performed by: INTERNAL MEDICINE

## 2023-01-01 PROCEDURE — 63600175 PHARM REV CODE 636 W HCPCS: Performed by: INTERNAL MEDICINE

## 2023-01-01 PROCEDURE — 80053 COMPREHEN METABOLIC PANEL: CPT | Performed by: INTERNAL MEDICINE

## 2023-01-01 PROCEDURE — 89055 LEUKOCYTE ASSESSMENT FECAL: CPT | Performed by: INTERNAL MEDICINE

## 2023-01-01 PROCEDURE — 85379 FIBRIN DEGRADATION QUANT: CPT | Performed by: INTERNAL MEDICINE

## 2023-01-01 PROCEDURE — 27000207 HC ISOLATION

## 2023-01-01 PROCEDURE — 85025 COMPLETE CBC W/AUTO DIFF WBC: CPT | Performed by: INTERNAL MEDICINE

## 2023-01-01 PROCEDURE — 25000242 PHARM REV CODE 250 ALT 637 W/ HCPCS: Performed by: INTERNAL MEDICINE

## 2023-01-01 RX ORDER — QUETIAPINE FUMARATE 25 MG/1
25 TABLET, FILM COATED ORAL 2 TIMES DAILY
Status: DISCONTINUED | OUTPATIENT
Start: 2023-01-01 | End: 2023-01-04

## 2023-01-01 RX ORDER — LOPERAMIDE HYDROCHLORIDE 2 MG/1
2 CAPSULE ORAL 4 TIMES DAILY PRN
Status: DISCONTINUED | OUTPATIENT
Start: 2023-01-01 | End: 2023-01-05 | Stop reason: HOSPADM

## 2023-01-01 RX ADMIN — HEPARIN SODIUM 18 UNITS/KG/HR: 10000 INJECTION, SOLUTION INTRAVENOUS at 07:01

## 2023-01-01 RX ADMIN — Medication 6 MG: at 11:01

## 2023-01-01 RX ADMIN — DOXYCYCLINE HYCLATE 100 MG: 100 TABLET, COATED ORAL at 08:01

## 2023-01-01 RX ADMIN — CHOLECALCIFEROL TAB 25 MCG (1000 UNIT) 5000 UNITS: 25 TAB at 08:01

## 2023-01-01 RX ADMIN — Medication 500 MG: at 08:01

## 2023-01-01 RX ADMIN — DEXAMETHASONE 6 MG: 2 TABLET ORAL at 08:01

## 2023-01-01 RX ADMIN — ZINC SULFATE 220 MG (50 MG) CAPSULE 220 MG: CAPSULE at 08:01

## 2023-01-01 RX ADMIN — MIRTAZAPINE 15 MG: 15 TABLET, FILM COATED ORAL at 11:01

## 2023-01-01 RX ADMIN — ASPIRIN 81 MG: 81 TABLET, COATED ORAL at 08:01

## 2023-01-01 RX ADMIN — PIPERACILLIN AND TAZOBACTAM 4.5 G: 4; .5 INJECTION, POWDER, FOR SOLUTION INTRAVENOUS; PARENTERAL at 04:01

## 2023-01-01 RX ADMIN — REMDESIVIR 200 MG: 100 INJECTION, POWDER, LYOPHILIZED, FOR SOLUTION INTRAVENOUS at 05:01

## 2023-01-01 RX ADMIN — DOXYCYCLINE HYCLATE 100 MG: 100 TABLET, COATED ORAL at 11:01

## 2023-01-01 RX ADMIN — PIPERACILLIN AND TAZOBACTAM 4.5 G: 4; .5 INJECTION, POWDER, FOR SOLUTION INTRAVENOUS; PARENTERAL at 12:01

## 2023-01-01 RX ADMIN — PIPERACILLIN AND TAZOBACTAM 4.5 G: 4; .5 INJECTION, POWDER, FOR SOLUTION INTRAVENOUS; PARENTERAL at 08:01

## 2023-01-01 RX ADMIN — QUETIAPINE FUMARATE 25 MG: 25 TABLET ORAL at 09:01

## 2023-01-01 RX ADMIN — TAMSULOSIN HYDROCHLORIDE 0.4 MG: 0.4 CAPSULE ORAL at 08:01

## 2023-01-01 RX ADMIN — FINASTERIDE 5 MG: 5 TABLET, FILM COATED ORAL at 09:01

## 2023-01-01 RX ADMIN — Medication 500 MG: at 11:01

## 2023-01-01 RX ADMIN — QUETIAPINE FUMARATE 25 MG: 25 TABLET ORAL at 11:01

## 2023-01-01 RX ADMIN — ATORVASTATIN CALCIUM 20 MG: 10 TABLET, FILM COATED ORAL at 08:01

## 2023-01-01 NOTE — PROGRESS NOTES
[facility]  Hospital Medicine Progress Note        Chief Complaint: Inpatient Follow-up for covid    HPI:   82-year-old male with significant if chronic kidney IIIb, HTN, hyperuricemia, type 2 diabetes mellitus, anemia of chronic disease, cognitive deficit presented to the ED from nursing home with complaints of shortness of breath.  Patient was recently diagnosed with COVID-19 infection.  Patient was borderline hypotensive, tachypneic, hypothermic in the ED.  Placed on Gerri Hugger.  Oxygen supplementation given and hypoxemia improved.  Lab significant for severe leukocytosis, mildly elevated troponin which later trended up.  Patient also had worsening renal insufficiency compared to baseline.  COVID-19 PCR positive.  Chest x-ray with prominent interstitial markings.  Patient was initiated on broad-spectrum antimicrobials, IV fluids and was admitted to hospitalist medicine service.  TTE showed worsened EF 20-25% and possible LV thrombus.  Cardiology again reviewed the echo determine there is no LV thrombus.  Recommended to have outpatient ischemic workup,Given COVID and requiring supplemental oxygen.  No life vest due to dementia.  Also recommended to avoid diuretics due to abnormal renal indices.      Labs showed elevated inflammatory markers.   DVT in the right lower extremity noted.  Started on heparin drip.      Interval Hx:   Patient was started on heparin drip yesterday for lower extremity DVT given renal dysfunction.  Patient developed bradycardia after starting remdesivir yesterday evening.  Remdesivir stopped.  Given worsened x-ray, obtained CT chest noncontrast which revealed bilateral dependent worsening of infiltrates representing possible aspiration pneumonia.  Oxygen requirements slightly went up to 4 L nasal cannula.  Patient was resting comfortably this morning.  Reported he had multiple episodes of loose bowel movements, non bloody.  Reported he was agitated,  disoriented last night.  Family reported patient has baseline cognitive problems and issues with agitation even at nursing home.     Objective/physical exam:  General: In no acute distress, resting comfortably in bed.    Chest:  Diminished breath sounds with crackles  noted.    Heart:  Regular S1, S2,   Abdomen: Soft, nontender, BS +  MSK:  Left ankle more swollen than right.   Neurologic:  Alert but disoriented, moving all extremities    VITAL SIGNS: 24 HRS MIN & MAX LAST   Temp  Min: 93.3 °F (34.1 °C)  Max: 97.4 °F (36.3 °C) 97.4 °F (36.3 °C)   BP  Min: 141/66  Max: 171/60 (!) 149/66   Pulse  Min: 54  Max: 60  (!) 58   Resp  Min: 20  Max: 20 20     SpO2  Min: 93 %  Max: 97 % (!) 94 %       Recent Labs   Lab 01/01/23  0719   WBC 7.0   RBC 3.61*   HGB 10.7*   HCT 33.0*   MCV 91.4   MCH 29.6   MCHC 32.4*   RDW 16.0*      MPV 10.4         Recent Labs   Lab 12/26/22  1345 12/28/22  1217 12/28/22  1229 12/29/22  0452 01/01/23  0719   *   < >  --    < > 145   K 4.9   < >  --    < > 4.4   CO2 22*   < >  --    < > 20*   BUN 53.2*   < >  --    < > 67.5*   CREATININE 2.07*   < >  --    < > 1.80*   CALCIUM 8.5*   < >  --    < > 8.3*   PH  --   --  7.32*  --   --    MG 1.70  --   --   --   --    ALBUMIN 2.5*   < >  --    < > 1.9*   ALKPHOS 110   < >  --    < > 79   ALT 41   < >  --    < > 25   AST 47*   < >  --    < > 20   BILITOT 0.6   < >  --    < > 0.5    < > = values in this interval not displayed.          Microbiology Results (last 7 days)       Procedure Component Value Units Date/Time    Blood Culture #2 **CANNOT BE ORDERED STAT** [[id number]]  (Normal) Collected: 12/28/22 1226    Order Status: Completed Specimen: Blood Updated: 01/01/23 1401     CULTURE, BLOOD ([facility]) No Growth At 96 Hours    Blood Culture #1 **CANNOT BE ORDERED STAT** [[id number]]  (Normal) Collected: 12/28/22 1217    Order Status: Completed Specimen: Blood Updated: 01/01/23 1401     CULTURE, BLOOD ([facility]) No Growth At 96 Hours    Stool  Culture [[id number]]     Order Status: Sent Specimen: Stool     Urine culture [[id number]] Collected: 12/29/22 1436    Order Status: Completed Specimen: Urine Updated: 12/31/22 1121     Urine Culture No Growth             Scheduled Med:   ascorbic acid (vitamin C)  500 mg Oral BID    aspirin  81 mg Oral Daily    atorvastatin  20 mg Oral Daily    dexAMETHasone  6 mg Oral Daily    doxycycline  100 mg Oral Q12H    finasteride  5 mg Oral Daily    metoprolol succinate  25 mg Oral Daily    mirtazapine  15 mg Oral QHS    perflutren lipid microspheres  1.3 mL Intravenous Once    piperacillin-tazobactam (ZOSYN) IVPB  4.5 g Intravenous Q8H    QUEtiapine  25 mg Oral BID    tamsulosin  0.4 mg Oral Daily    vitamin D  5,000 Units Oral Daily    zinc sulfate  220 mg Oral Daily          Assessment/Plan:  Severe COVID-19 pneumonitis   Decompensated Heart failure with reduced ejection fraction  Possible aspiration pneumonia based on CT finding  Acute hypoxemic respiratory failure secondary to above   Sirs-sepsis secondary to COVID-19 versus superimposed bacterial infection   Acute right lower extremity DVT  Acute on chronic kidney disease, stage IIIB  NSTEMI-possible type 2  Hyperglycemia in the setting of Type 2 diabetes mellitus on glucocorticoids A1c 5.2%  Diarrhea possibly viral, antibiotics related  Essential HTN-stable   Hyperuricemia   Anemia of chronic disease   Prophylaxis    Continue IV Zosyn, doxycycline  Keep NPO   Obtain speech consult   Send  stool for leukocytes, culture  Continue dexamethasone 6 mg, possible contribution to aggressive behavior  Continue IV heparin drip monitor APTT   Hold remdesivir given bradycardia   Continue supplemental oxygen to keep sats above 92.    Continue strict aspiration precautions   Appreciate cardiology recommendations.Continue aspirin, beta-blockers , unable to diurese given renal function.planning for outpatient ischemic evaluation . Echo showed severely decreased EF 20-25%.  Avoid  nephrotoxins  Monitor blood sugars, continue Sliding scale for diabetes mellitus  Resumed other home meds-finasteride, Remeron, Seroquel, Flomax, vitamin-D   Daily CBC, CMP, CRP    Discussed with son Mr. Jose Lorenzo who is POA regarding goals of care on 12/31.   Code status : DNI     DVT prophylaxis-Lovenox     Critical care time spent:  50 minutes   Critical care diagnosis:  Sepsis Acute hypoxic respiratory failure, COVID, congestive heart failure    Deangelo Cabello MD   01/01/2023

## 2023-01-02 LAB
ALBUMIN SERPL-MCNC: 2 G/DL (ref 3.4–4.8)
ALBUMIN/GLOB SERPL: 0.4 RATIO (ref 1.1–2)
ALP SERPL-CCNC: 86 UNIT/L (ref 40–150)
ALT SERPL-CCNC: 24 UNIT/L (ref 0–55)
APPEARANCE UR: ABNORMAL
APTT PPP: 28.6 SECONDS (ref 23.2–33.7)
APTT PPP: 30 SECONDS (ref 23.2–33.7)
AST SERPL-CCNC: 18 UNIT/L (ref 5–34)
BACTERIA #/AREA URNS AUTO: ABNORMAL /HPF
BACTERIA BLD CULT: NORMAL
BACTERIA BLD CULT: NORMAL
BASOPHILS # BLD AUTO: 0.01 X10(3)/MCL (ref 0–0.2)
BASOPHILS NFR BLD AUTO: 0.1 %
BILIRUB UR QL STRIP.AUTO: ABNORMAL MG/DL
BILIRUBIN DIRECT+TOT PNL SERPL-MCNC: 0.5 MG/DL
BUN SERPL-MCNC: 68.2 MG/DL (ref 8.4–25.7)
CALCIUM SERPL-MCNC: 9.3 MG/DL (ref 8.8–10)
CHLORIDE SERPL-SCNC: 114 MMOL/L (ref 98–107)
CO2 SERPL-SCNC: 22 MMOL/L (ref 23–31)
COLOR UR AUTO: ABNORMAL
CORRECTED TEMPERATURE (PCO2): 36 MMHG (ref 35–45)
CORRECTED TEMPERATURE (PH): 7.46 (ref 7.35–7.45)
CORRECTED TEMPERATURE (PO2): 58 MMHG (ref 80–100)
CREAT SERPL-MCNC: 1.97 MG/DL (ref 0.73–1.18)
CRP SERPL-MCNC: 122.8 MG/L
EOSINOPHIL # BLD AUTO: 0 X10(3)/MCL (ref 0–0.9)
EOSINOPHIL NFR BLD AUTO: 0 %
ERYTHROCYTE [DISTWIDTH] IN BLOOD BY AUTOMATED COUNT: 15.9 % (ref 11.6–14.4)
GFR SERPLBLD CREATININE-BSD FMLA CKD-EPI: 34 MLS/MIN/1.73/M2
GLOBULIN SER-MCNC: 4.6 GM/DL (ref 2.4–3.5)
GLUCOSE SERPL-MCNC: 134 MG/DL (ref 82–115)
GLUCOSE UR QL STRIP.AUTO: NEGATIVE MG/DL
HCO3 UR-SCNC: 25.6 MMOL/L (ref 22–26)
HCT VFR BLD AUTO: 33.6 % (ref 42–52)
HCT VFR BLD AUTO: 34 % (ref 42–52)
HGB BLD-MCNC: 11 G/DL (ref 12–16)
HGB BLD-MCNC: 11.1 GM/DL (ref 14–18)
HGB BLD-MCNC: 11.4 GM/DL (ref 14–18)
IMM GRANULOCYTES # BLD AUTO: 0.38 X10(3)/MCL (ref 0–0.04)
IMM GRANULOCYTES NFR BLD AUTO: 4.6 %
KETONES UR QL STRIP.AUTO: NEGATIVE MG/DL
LEUKOCYTE ESTERASE UR QL STRIP.AUTO: ABNORMAL UNIT/L
LYMPHOCYTES # BLD AUTO: 0.4 X10(3)/MCL (ref 0.6–4.6)
LYMPHOCYTES NFR BLD AUTO: 4.9 %
MCH RBC QN AUTO: 30.2 PG
MCHC RBC AUTO-ENTMCNC: 33.5 MG/DL (ref 33–36)
MCV RBC AUTO: 90.2 FL (ref 80–94)
MONOCYTES # BLD AUTO: 0.34 X10(3)/MCL (ref 0.1–1.3)
MONOCYTES NFR BLD AUTO: 4.1 %
NEUTROPHILS # BLD AUTO: 7.07 X10(3)/MCL (ref 2.1–9.2)
NEUTROPHILS NFR BLD AUTO: 86.3 %
NITRITE UR QL STRIP.AUTO: POSITIVE
NRBC BLD AUTO-RTO: 0 % (ref 0–1)
PCO2 BLDA: 36 MMHG (ref 35–45)
PH SMN: 7.46 [PH] (ref 7.35–7.45)
PH UR STRIP.AUTO: 5 [PH]
PLATELET # BLD AUTO: 217 X10(3)/MCL (ref 140–371)
PMV BLD AUTO: 11 FL (ref 9.4–12.4)
PO2 BLDA: 58 MMHG (ref 80–100)
POC BASE DEFICIT: 1.9 MMOL/L (ref -2–2)
POC COHB: 1.1 %
POC IONIZED CALCIUM: 1.28 MMOL/L (ref 1.12–1.23)
POC METHB: 0.6 % (ref 0.4–1.5)
POC O2HB: 90 % (ref 94–97)
POC SATURATED O2: 91.3 %
POC TEMPERATURE: 37 °C
POCT GLUCOSE: 137 MG/DL (ref 70–110)
POCT GLUCOSE: 153 MG/DL (ref 70–110)
POCT GLUCOSE: 181 MG/DL (ref 70–110)
POCT GLUCOSE: 220 MG/DL (ref 70–110)
POTASSIUM BLD-SCNC: 4.1 MMOL/L (ref 3.5–5)
POTASSIUM SERPL-SCNC: 4.4 MMOL/L (ref 3.5–5.1)
PROT SERPL-MCNC: 6.6 GM/DL (ref 5.8–7.6)
PROT UR QL STRIP.AUTO: ABNORMAL MG/DL
RBC # BLD AUTO: 3.77 X10(6)/MCL (ref 4.7–6.1)
RBC #/AREA URNS AUTO: >100 /HPF
RBC #/AREA URNS AUTO: ABNORMAL /[HPF]
RBC UR QL AUTO: ABNORMAL UNIT/L
SODIUM BLD-SCNC: 145 MMOL/L (ref 137–145)
SODIUM SERPL-SCNC: 148 MMOL/L (ref 136–145)
SP GR UR STRIP.AUTO: 1.02 (ref 1–1.03)
SPECIMEN SOURCE: ABNORMAL
SQUAMOUS #/AREA URNS AUTO: ABNORMAL /HPF
SQUAMOUS #/AREA URNS AUTO: ABNORMAL /[HPF]
UROBILINOGEN UR STRIP-ACNC: 0.2 MG/DL
WBC # SPEC AUTO: 8.2 X10(3)/MCL (ref 4.5–11.5)
WBC #/AREA URNS AUTO: ABNORMAL /HPF
WBC #/AREA URNS AUTO: ABNORMAL /[HPF]

## 2023-01-02 PROCEDURE — 27000221 HC OXYGEN, UP TO 24 HOURS

## 2023-01-02 PROCEDURE — 99900035 HC TECH TIME PER 15 MIN (STAT)

## 2023-01-02 PROCEDURE — 85018 HEMOGLOBIN: CPT | Performed by: INTERNAL MEDICINE

## 2023-01-02 PROCEDURE — 86140 C-REACTIVE PROTEIN: CPT | Performed by: INTERNAL MEDICINE

## 2023-01-02 PROCEDURE — 21400001 HC TELEMETRY ROOM

## 2023-01-02 PROCEDURE — 63600175 PHARM REV CODE 636 W HCPCS: Performed by: INTERNAL MEDICINE

## 2023-01-02 PROCEDURE — 80053 COMPREHEN METABOLIC PANEL: CPT | Performed by: INTERNAL MEDICINE

## 2023-01-02 PROCEDURE — 36600 WITHDRAWAL OF ARTERIAL BLOOD: CPT

## 2023-01-02 PROCEDURE — 27000207 HC ISOLATION

## 2023-01-02 PROCEDURE — 36415 COLL VENOUS BLD VENIPUNCTURE: CPT | Performed by: INTERNAL MEDICINE

## 2023-01-02 PROCEDURE — 25000242 PHARM REV CODE 250 ALT 637 W/ HCPCS: Performed by: INTERNAL MEDICINE

## 2023-01-02 PROCEDURE — 85730 THROMBOPLASTIN TIME PARTIAL: CPT | Performed by: INTERNAL MEDICINE

## 2023-01-02 PROCEDURE — 25000003 PHARM REV CODE 250: Performed by: INTERNAL MEDICINE

## 2023-01-02 PROCEDURE — 81003 URINALYSIS AUTO W/O SCOPE: CPT | Performed by: INTERNAL MEDICINE

## 2023-01-02 PROCEDURE — 92610 EVALUATE SWALLOWING FUNCTION: CPT

## 2023-01-02 PROCEDURE — 85025 COMPLETE CBC W/AUTO DIFF WBC: CPT | Performed by: INTERNAL MEDICINE

## 2023-01-02 PROCEDURE — 82803 BLOOD GASES ANY COMBINATION: CPT

## 2023-01-02 PROCEDURE — 87088 URINE BACTERIA CULTURE: CPT | Performed by: INTERNAL MEDICINE

## 2023-01-02 PROCEDURE — 51702 INSERT TEMP BLADDER CATH: CPT

## 2023-01-02 RX ORDER — HYDRALAZINE HYDROCHLORIDE 20 MG/ML
5 INJECTION INTRAMUSCULAR; INTRAVENOUS EVERY 8 HOURS PRN
Status: DISCONTINUED | OUTPATIENT
Start: 2023-01-02 | End: 2023-01-05 | Stop reason: HOSPADM

## 2023-01-02 RX ADMIN — INSULIN ASPART 2 UNITS: 100 INJECTION, SOLUTION INTRAVENOUS; SUBCUTANEOUS at 04:01

## 2023-01-02 RX ADMIN — ASPIRIN 81 MG: 81 TABLET, COATED ORAL at 08:01

## 2023-01-02 RX ADMIN — PIPERACILLIN AND TAZOBACTAM 4.5 G: 4; .5 INJECTION, POWDER, FOR SOLUTION INTRAVENOUS; PARENTERAL at 08:01

## 2023-01-02 RX ADMIN — ZINC SULFATE 220 MG (50 MG) CAPSULE 220 MG: CAPSULE at 08:01

## 2023-01-02 RX ADMIN — LEUCINE, PHENYLALANINE, LYSINE, METHIONINE, ISOLEUCINE, VALINE, HISTIDINE, THREONINE, TRYPTOPHAN, ALANINE, GLYCINE, ARGININE, PROLINE, SERINE, TYROSINE, SODIUM ACETATE, DIBASIC POTASSIUM PHOSPHATE, MAGNESIUM CHLORIDE, SODIUM CHLORIDE, CALCIUM CHLORIDE, DEXTROSE
311; 238; 247; 170; 255; 247; 204; 179; 77; 880; 438; 489; 289; 213; 17; 297; 261; 51; 77; 33; 5 INJECTION INTRAVENOUS at 03:01

## 2023-01-02 RX ADMIN — Medication 500 MG: at 09:01

## 2023-01-02 RX ADMIN — QUETIAPINE FUMARATE 25 MG: 25 TABLET ORAL at 08:01

## 2023-01-02 RX ADMIN — Medication 500 MG: at 08:01

## 2023-01-02 RX ADMIN — INSULIN ASPART 1 UNITS: 100 INJECTION, SOLUTION INTRAVENOUS; SUBCUTANEOUS at 11:01

## 2023-01-02 RX ADMIN — LOPERAMIDE HYDROCHLORIDE 2 MG: 2 CAPSULE ORAL at 08:01

## 2023-01-02 RX ADMIN — PIPERACILLIN AND TAZOBACTAM 4.5 G: 4; .5 INJECTION, POWDER, FOR SOLUTION INTRAVENOUS; PARENTERAL at 01:01

## 2023-01-02 RX ADMIN — ATORVASTATIN CALCIUM 20 MG: 10 TABLET, FILM COATED ORAL at 08:01

## 2023-01-02 RX ADMIN — FINASTERIDE 5 MG: 5 TABLET, FILM COATED ORAL at 08:01

## 2023-01-02 RX ADMIN — QUETIAPINE FUMARATE 25 MG: 25 TABLET ORAL at 09:01

## 2023-01-02 RX ADMIN — METOPROLOL SUCCINATE 25 MG: 25 TABLET, EXTENDED RELEASE ORAL at 08:01

## 2023-01-02 RX ADMIN — DOXYCYCLINE HYCLATE 100 MG: 100 TABLET, COATED ORAL at 08:01

## 2023-01-02 RX ADMIN — TAMSULOSIN HYDROCHLORIDE 0.4 MG: 0.4 CAPSULE ORAL at 08:01

## 2023-01-02 RX ADMIN — PIPERACILLIN AND TAZOBACTAM 4.5 G: 4; .5 INJECTION, POWDER, FOR SOLUTION INTRAVENOUS; PARENTERAL at 04:01

## 2023-01-02 RX ADMIN — CHOLECALCIFEROL TAB 25 MCG (1000 UNIT) 5000 UNITS: 25 TAB at 08:01

## 2023-01-02 RX ADMIN — HYDRALAZINE HYDROCHLORIDE 5 MG: 20 INJECTION INTRAMUSCULAR; INTRAVENOUS at 05:01

## 2023-01-02 RX ADMIN — DOXYCYCLINE HYCLATE 100 MG: 100 TABLET, COATED ORAL at 09:01

## 2023-01-02 RX ADMIN — DEXAMETHASONE 6 MG: 2 TABLET ORAL at 08:01

## 2023-01-02 RX ADMIN — MIRTAZAPINE 15 MG: 15 TABLET, FILM COATED ORAL at 09:01

## 2023-01-02 NOTE — NURSING
Per night shift pt had bloody urine. Holding heparin until further notice from MD. Also, per MD no ABGs needed. He will notify family.

## 2023-01-02 NOTE — PLAN OF CARE
Problem: SLP  Goal: SLP Goal  Description: LTG: Pt will tolerate least restrictive PO diet with no clinical signs/sx aspiration    STGs:  Pt will tolerate mild liquids by cup/straw with no clinical signs/sx aspiration.  Pt will tolerate puree solids with no clinical signs/symptoms of aspiration.  Pt will participate in a modified barium swallow study when appropriate.     Outcome: Ongoing, Progressing

## 2023-01-02 NOTE — PROGRESS NOTES
[facility]  Hospital Medicine Progress Note        Chief Complaint: Inpatient Follow-up for covid    HPI:   82-year-old male with significant if chronic kidney IIIb, HTN, hyperuricemia, type 2 diabetes mellitus, anemia of chronic disease, cognitive deficit presented to the ED from nursing home with complaints of shortness of breath.  Patient was recently diagnosed with COVID-19 infection.  Patient was borderline hypotensive, tachypneic, hypothermic in the ED.  Placed on Gerri Hugger.  Oxygen supplementation given and hypoxemia improved.  Lab significant for severe leukocytosis, mildly elevated troponin which later trended up.  Patient also had worsening renal insufficiency compared to baseline.  COVID-19 PCR positive.  Chest x-ray with prominent interstitial markings.  Patient was initiated on broad-spectrum antimicrobials, IV fluids and was admitted to hospitalist medicine service.  TTE showed worsened EF 20-25% and possible LV thrombus.  Cardiology again reviewed the echo determine there is no LV thrombus.  Recommended to have outpatient ischemic workup,Given COVID and requiring supplemental oxygen.  No life vest due to dementia.  Also recommended to avoid diuretics due to abnormal renal indices.      Labs showed elevated inflammatory markers.   DVT in the right lower extremity noted.  Started on heparin drip.  Held due to high APTT and small hematuria.  Patient developed bradycardia after starting remdesivir yesterday evening.  Remdesivir stopped.    CT chest noncontrast which revealed bilateral dependent worsening of infiltrates representing possible aspiration pneumonia      Interval Hx:   Events noted.  Patient developed hypothermia needing warm blankets .  Patient was started on heparin drip for lower extremity DVT given renal dysfunction, monitored APTT which was elevated at some point but normalized currently after stopping heparin drip..  But patient noted to have hematuria, no  fresh blood.  H&H remained stable, sodium elevated 148.  Stable renal function. Yesterday patient was noted to have multiple episodes loose bowel movements.  Fecal leukocytes negative.  Possibly related to COVID, antibiotics related.  Family reported patient has baseline cognitive problems and issues with agitation even at nursing home.  blood pressure elevated probably due to agitation     Objective/physical exam:  General:  Restless, mild distress   Chest:  Decreased breath sounds with rales rhonchi , tachypnea noted on nasal cannula 4 L saturating 92%   Heart:  Regular rate and rhythm  Abdomen: Soft, nontender, BS +  MSK:  Mild Pedal edema left greater than right    Neurologic:  Alert but disoriented, moving all extremities    VITAL SIGNS: 24 HRS MIN & MAX LAST   Temp  Min: 97.4 °F (36.3 °C)  Max: 97.6 °F (36.4 °C) 97.6 °F (36.4 °C)   BP  Min: 133/76  Max: 169/84 (!) 169/84   Pulse  Min: 54  Max: 88  85   Resp  Min: 16  Max: 30 (!) 30     SpO2  Min: 90 %  Max: 98 % (!) 92 %       Recent Labs   Lab 01/02/23  0356 01/02/23  0710   WBC 8.2  --    RBC 3.77*  --    HGB 11.4* 11.1*   HCT 34.0* 33.6*   MCV 90.2  --    MCH 30.2  --    MCHC 33.5  --    RDW 15.9*  --      --    MPV 11.0  --          Recent Labs   Lab 12/26/22  1345 12/28/22  1217 01/02/23  0356 01/02/23  1014   *   < > 148*  --    K 4.9   < > 4.4  --    CO2 22*   < > 22*  --    BUN 53.2*   < > 68.2*  --    CREATININE 2.07*   < > 1.97*  --    CALCIUM 8.5*   < > 9.3  --    PH  --    < >  --  7.46*   MG 1.70  --   --   --    ALBUMIN 2.5*   < > 2.0*  --    ALKPHOS 110   < > 86  --    ALT 41   < > 24  --    AST 47*   < > 18  --    BILITOT 0.6   < > 0.5  --     < > = values in this interval not displayed.          Microbiology Results (last 7 days)       Procedure Component Value Units Date/Time    Stool Culture [[id number]]  (Abnormal) Collected: 01/01/23 1638    Order Status: Completed Specimen: Stool Updated: 01/02/23 0811     Stool Culture  Negative for Salmonella, Shigella, Campylobacter, Vibrio, Aeromonas, Pleisiomonas,Yersinia, or Shiga Toxin 1 and 2.      Moderate Yeast    Blood Culture #2 **CANNOT BE ORDERED STAT** [[id number]]  (Normal) Collected: 12/28/22 1226    Order Status: Completed Specimen: Blood Updated: 01/01/23 1401     CULTURE, BLOOD ([facility]) No Growth At 96 Hours    Blood Culture #1 **CANNOT BE ORDERED STAT** [[id number]]  (Normal) Collected: 12/28/22 1217    Order Status: Completed Specimen: Blood Updated: 01/01/23 1401     CULTURE, BLOOD ([facility]) No Growth At 96 Hours    Urine culture [[id number]] Collected: 12/29/22 1436    Order Status: Completed Specimen: Urine Updated: 12/31/22 1121     Urine Culture No Growth             Scheduled Med:   ascorbic acid (vitamin C)  500 mg Oral BID    aspirin  81 mg Oral Daily    atorvastatin  20 mg Oral Daily    dexAMETHasone  6 mg Oral Daily    doxycycline  100 mg Oral Q12H    finasteride  5 mg Oral Daily    metoprolol succinate  25 mg Oral Daily    mirtazapine  15 mg Oral QHS    perflutren lipid microspheres  1.3 mL Intravenous Once    piperacillin-tazobactam (ZOSYN) IVPB  4.5 g Intravenous Q8H    QUEtiapine  25 mg Oral BID    tamsulosin  0.4 mg Oral Daily    vitamin D  5,000 Units Oral Daily    zinc sulfate  220 mg Oral Daily          Assessment/Plan:  Severe COVID-19 pneumonitis   Decompensated Heart failure with reduced ejection fraction  Possible aspiration pneumonia based on CT finding  Acute hypoxemic respiratory failure secondary to above   Sirs-sepsis secondary to COVID-19 versus superimposed bacterial infection   Acute right lower extremity DVT  Acute on chronic kidney disease, stage IIIB  NSTEMI-possible type 2  Hypernatremia  Hyperglycemia in the setting of Type 2 diabetes mellitus on glucocorticoids A1c 5.2%  Diarrhea possibly viral, antibiotics related  Essential HTN-stable   Hyperuricemia   Anemia of chronic disease       Overall clinical picture is worsening.  Patient becoming  hypothermic, bradycardic, hypertensive, hematuria.  We will obtain stat head CT to see any ICH  We will obtain ABG.  Recommend BiPAP for work of breathing if patient tolerable.   Continue supplemental oxygen to keep sats above 92.    Continue IV Zosyn, doxycycline for possible aspiration pneumonia.  Keep NPO   Follow up speech eval  Continue dexamethasone 6 mg, possible contribution to aggressive behavior  Hold heparin drip, Lovenox until hematuria resolves, until head CT negative for any intracranial bleed.  Hold remdesivir given bradycardia   Continue strict aspiration precautions   We will start him on peripheral nutrition  Appreciate cardiology recommendations.Continue aspirin, beta-blockers , unable to diurese given renal function.planning for outpatient ischemic evaluation . Echo showed severely decreased EF 20-25%.  Given poor EF, we will avoid IV fluids in the setting of requiring oxygen  Avoid nephrotoxins  Monitor blood sugars, continue Sliding scale for diabetes mellitus  Continue Seroquel 25 b.i.d. dose increased   Administer p.o. meds per speech evaluation  Daily CBC, CMP, CRP    We will discuss with son regarding goals of care and their opinion about involving palliative care.    Code status : DNI     DVT prophylaxis-SCDs  Critical care time spent:  50 minutes   Critical care diagnosis:  Sepsis Acute hypoxic respiratory failure, COVID, congestive heart failure    Deangelo Cabello MD   01/02/2023

## 2023-01-02 NOTE — NURSING
Bipap orders put in pt will have to be in negative pressure room. Notified MD. Pt can be on oxymask instead.

## 2023-01-02 NOTE — PT/OT/SLP EVAL
Speech Language Pathology Department  Clinical Swallow Evaluation    Patient Name:  Caryn Lorenzo   MRN:  [id number]  Admitting Diagnosis: Pneumonia due to COVID-19 virus    Recommendations:     General recommendations:  Modified Barium Swallow Study and dysphagia therapy when appropriate  Diet recommendations:  NPO, Liquid Diet Level: NPO   Swallow strategies/precautions: medications crushed in puree  Precautions: Standard, aspiration, NPO    History:     Past Medical History:   Diagnosis Date    BPH (benign prostatic hyperplasia)     Cognitive communication deficit        History reviewed. No pertinent surgical history.    Home Diet: Minced & Moist and mildly thick liquids  Current Method of Nutrition: NPO    Patient complaint: No complaints at this time.     Subjective     Patient calm and cooperative.    Patient goals: to eat and drink by mouth at PLOF    Spiritual/Cultural/Quaker Beliefs/Practices that affect care: no    Pain/Comfort: Pain Rating 1: 0/10    Respiratory Status: nasal cannula    Objective:     Oral Musculature Evaluation  Oral Musculature: general weakness  Dentition: scattered dentition, teeth in poor condition  Secretion Management: adequate  Mucosal Quality: dry  Mandibular Strength and Mobility: WFL  Oral Labial Strength and Mobility: WFL    Consistency Fed By Oral Symptoms Pharyngeal Symptoms   Mildly thick liquid by straw SLP Weak sucking  Slowed oral transit time Multiple swallows  Cough after swallow   Puree SLP Slowed oral transit time None     Assessment:     Pt signs/symptoms of aspiration with PO intake. He appears lethargic but able to be aroused. Recommending a comprehensive evaluation of swallow function when appropriate.     Goals:   Multidisciplinary Problems       SLP Goals          Problem: SLP    Goal Priority Disciplines Outcome   SLP Goal     SLP Ongoing, Progressing   Description: LTG: Pt will tolerate least restrictive PO diet with no clinical signs/sx  aspiration    STGs:  Pt will tolerate mild liquids by cup/straw with no clinical signs/sx aspiration.  Pt will tolerate puree solids with no clinical signs/symptoms of aspiration.  Pt will participate in a modified barium swallow study when appropriate.                        Patient Education:     Patient provided with verbal education regarding results/recommendations.  Understanding was verbalized, however additional teaching warranted.    Plan:     SLP Follow-Up:  Yes   Plan of Care reviewed with:  patient      Time Tracking:     SLP Treatment Date:   01/02/23  Speech Start Time:  1000  Speech Stop Time:  1020     Speech Total Time (min):  20 min    Billable minutes:  Swallow and Oral Function Evaluation, 20 01/02/2023

## 2023-01-03 LAB
ABS NEUT (OLG): 7.68 X10(3)/MCL (ref 2.1–9.2)
ALBUMIN SERPL-MCNC: 2 G/DL (ref 3.4–4.8)
ALBUMIN/GLOB SERPL: 0.4 RATIO (ref 1.1–2)
ALP SERPL-CCNC: 78 UNIT/L (ref 40–150)
ALT SERPL-CCNC: 22 UNIT/L (ref 0–55)
ANISOCYTOSIS BLD QL SMEAR: ABNORMAL
AST SERPL-CCNC: 18 UNIT/L (ref 5–34)
BACTERIA STL CULT: ABNORMAL
BACTERIA STL CULT: ABNORMAL
BILIRUBIN DIRECT+TOT PNL SERPL-MCNC: 0.5 MG/DL
BUN SERPL-MCNC: 69.3 MG/DL (ref 8.4–25.7)
BURR CELLS (OLG): ABNORMAL
CALCIUM SERPL-MCNC: 9.6 MG/DL (ref 8.8–10)
CHLORIDE SERPL-SCNC: 119 MMOL/L (ref 98–107)
CO2 SERPL-SCNC: 20 MMOL/L (ref 23–31)
CREAT SERPL-MCNC: 1.86 MG/DL (ref 0.73–1.18)
ERYTHROCYTE [DISTWIDTH] IN BLOOD BY AUTOMATED COUNT: 16 % (ref 11.6–14.4)
GFR SERPLBLD CREATININE-BSD FMLA CKD-EPI: 36 MLS/MIN/1.73/M2
GLOBULIN SER-MCNC: 4.7 GM/DL (ref 2.4–3.5)
GLUCOSE SERPL-MCNC: 139 MG/DL (ref 82–115)
HCT VFR BLD AUTO: 36 % (ref 42–52)
HGB BLD-MCNC: 11.6 GM/DL (ref 14–18)
IMM GRANULOCYTES # BLD AUTO: 0.47 X10(3)/MCL (ref 0–0.04)
IMM GRANULOCYTES NFR BLD AUTO: 5.8 %
INSTRUMENT WBC (OLG): 8 X10(3)/MCL
LYMPHOCYTES NFR BLD MANUAL: 0.32 X10(3)/MCL
LYMPHOCYTES NFR BLD MANUAL: 4 %
MACROCYTES BLD QL SMEAR: ABNORMAL
MCH RBC QN AUTO: 29.3 PG
MCHC RBC AUTO-ENTMCNC: 32.2 MG/DL (ref 33–36)
MCV RBC AUTO: 90.9 FL (ref 80–94)
MYELOCYTES NFR BLD MANUAL: 2 %
NEUTROPHILS NFR BLD MANUAL: 94 %
NRBC BLD AUTO-RTO: 0 % (ref 0–1)
PLATELET # BLD AUTO: 206 X10(3)/MCL (ref 140–371)
PLATELET # BLD EST: ADEQUATE 10*3/UL
PMV BLD AUTO: 10.6 FL (ref 9.4–12.4)
POCT GLUCOSE: 155 MG/DL (ref 70–110)
POCT GLUCOSE: 156 MG/DL (ref 70–110)
POIKILOCYTOSIS BLD QL SMEAR: ABNORMAL
POTASSIUM SERPL-SCNC: 4.3 MMOL/L (ref 3.5–5.1)
PROT SERPL-MCNC: 6.7 GM/DL (ref 5.8–7.6)
RBC # BLD AUTO: 3.96 X10(6)/MCL (ref 4.7–6.1)
RBC MORPH BLD: ABNORMAL
SODIUM SERPL-SCNC: 148 MMOL/L (ref 136–145)
WBC # SPEC AUTO: 8.1 X10(3)/MCL (ref 4.5–11.5)

## 2023-01-03 PROCEDURE — 80053 COMPREHEN METABOLIC PANEL: CPT | Performed by: INTERNAL MEDICINE

## 2023-01-03 PROCEDURE — 63600175 PHARM REV CODE 636 W HCPCS: Performed by: INTERNAL MEDICINE

## 2023-01-03 PROCEDURE — 25000003 PHARM REV CODE 250: Performed by: INTERNAL MEDICINE

## 2023-01-03 PROCEDURE — 99223 1ST HOSP IP/OBS HIGH 75: CPT | Mod: CR,,, | Performed by: INTERNAL MEDICINE

## 2023-01-03 PROCEDURE — 85027 COMPLETE CBC AUTOMATED: CPT | Performed by: INTERNAL MEDICINE

## 2023-01-03 PROCEDURE — 27000221 HC OXYGEN, UP TO 24 HOURS

## 2023-01-03 PROCEDURE — 94761 N-INVAS EAR/PLS OXIMETRY MLT: CPT

## 2023-01-03 PROCEDURE — 51798 US URINE CAPACITY MEASURE: CPT

## 2023-01-03 PROCEDURE — 99497 ADVNCD CARE PLAN 30 MIN: CPT | Mod: CR,25,, | Performed by: INTERNAL MEDICINE

## 2023-01-03 PROCEDURE — 25000003 PHARM REV CODE 250: Performed by: NURSE PRACTITIONER

## 2023-01-03 PROCEDURE — 51700 IRRIGATION OF BLADDER: CPT

## 2023-01-03 PROCEDURE — 27000207 HC ISOLATION

## 2023-01-03 PROCEDURE — 21400001 HC TELEMETRY ROOM

## 2023-01-03 PROCEDURE — 51702 INSERT TEMP BLADDER CATH: CPT

## 2023-01-03 PROCEDURE — 99223 PR INITIAL HOSPITAL CARE,LEVL III: ICD-10-PCS | Mod: CR,,, | Performed by: INTERNAL MEDICINE

## 2023-01-03 PROCEDURE — 36415 COLL VENOUS BLD VENIPUNCTURE: CPT | Performed by: INTERNAL MEDICINE

## 2023-01-03 PROCEDURE — 99497 PR ADVNCD CARE PLAN 30 MIN: ICD-10-PCS | Mod: CR,25,, | Performed by: INTERNAL MEDICINE

## 2023-01-03 RX ADMIN — Medication 6 MG: at 09:01

## 2023-01-03 RX ADMIN — PIPERACILLIN AND TAZOBACTAM 4.5 G: 4; .5 INJECTION, POWDER, FOR SOLUTION INTRAVENOUS; PARENTERAL at 09:01

## 2023-01-03 RX ADMIN — PIPERACILLIN AND TAZOBACTAM 4.5 G: 4; .5 INJECTION, POWDER, FOR SOLUTION INTRAVENOUS; PARENTERAL at 01:01

## 2023-01-03 RX ADMIN — PIPERACILLIN AND TAZOBACTAM 4.5 G: 4; .5 INJECTION, POWDER, FOR SOLUTION INTRAVENOUS; PARENTERAL at 12:01

## 2023-01-03 RX ADMIN — MIRTAZAPINE 15 MG: 15 TABLET, FILM COATED ORAL at 09:01

## 2023-01-03 RX ADMIN — QUETIAPINE FUMARATE 25 MG: 25 TABLET ORAL at 09:01

## 2023-01-03 RX ADMIN — LIDOCAINE-EPINEPHRINE-TETRACAINE GEL 4-0.05-0.5%: 4-0.05-0.5 GEL at 12:01

## 2023-01-03 RX ADMIN — Medication 500 MG: at 09:01

## 2023-01-03 RX ADMIN — DOXYCYCLINE HYCLATE 100 MG: 100 TABLET, COATED ORAL at 09:01

## 2023-01-03 NOTE — PROGRESS NOTES
.UROLOGY  PROGRESS  NOTE    Caryn Lorenzo 1940  [id number]  1/3/2023    Nursing staff reports patient pulled Pelletier out this morning  Orders obtained for placement of 3 way catheter with CBI  Patient with mittens, confused  Hypertensive, stable HR, afebrile  450 mL UO charted overnight    Intake/Output:  No intake/output data recorded.  I/O last 3 completed shifts:  In: 495   Out: 600 [Urine:600]       Exam:    NAD, bilateral mittens on  Card RRR  Resp unlabored  Abd soft, NTND      Recent Results (from the past 24 hour(s))   Urinalysis    Collection Time: 01/02/23  1:12 PM   Result Value Ref Range    Color, UA Red (A) Yellow, Light-Yellow, Dark Yellow, Marielle, Straw    Appearance, UA Turbid (A) Clear    Specific Gravity, UA 1.021 1.001 - 1.030    pH, UA 5.0 5.0 - 8.5    Protein, UA 1+ (A) Negative mg/dL    Glucose, UA Negative Negative, Normal mg/dL    Ketones, UA Negative Negative mg/dL    Blood, UA 1+ (A) Negative unit/L    Bilirubin, UA 1+ (A) Negative mg/dL    Urobilinogen, UA 0.2 0.2, 1.0, Normal mg/dL    Nitrites, UA Positive (A) Negative    Leukocyte Esterase, UA 2+ (A) Negative unit/L   Urinalysis, Microscopic    Collection Time: 01/02/23  1:12 PM   Result Value Ref Range    RBC, UA      RBC, UA >100 (A) None Seen, 0-2, 3-5, 0-5 /HPF    WBC, UA      WBC, UA 21-50 (A) None Seen, 0-2, 3-5, 0-5 /HPF    Squamous Epithelial Cells, UA      Squamous Epithelial Cells, UA 0-4 0-4, None Seen /HPF    Bacteria, UA 1+ (A) None Seen, Rare, Occasional /HPF   Urine culture    Collection Time: 01/02/23  1:12 PM    Specimen: Urine, Clean Catch   Result Value Ref Range    Urine Culture No Growth At 24 Hours    POCT glucose    Collection Time: 01/02/23  4:30 PM   Result Value Ref Range    POCT Glucose 220 (H) 70 - 110 mg/dL   POCT glucose    Collection Time: 01/02/23 11:39 PM   Result Value Ref Range    POCT Glucose 137 (H) 70 - 110 mg/dL   Comprehensive Metabolic Panel    Collection Time: 01/03/23  4:47 AM   Result Value Ref  Range    Sodium Level 148 (H) 136 - 145 mmol/L    Potassium Level 4.3 3.5 - 5.1 mmol/L    Chloride 119 (H) 98 - 107 mmol/L    Carbon Dioxide 20 (L) 23 - 31 mmol/L    Glucose Level 139 (H) 82 - 115 mg/dL    Blood Urea Nitrogen 69.3 (H) 8.4 - 25.7 mg/dL    Creatinine 1.86 (H) 0.73 - 1.18 mg/dL    Calcium Level Total 9.6 8.8 - 10.0 mg/dL    Protein Total 6.7 5.8 - 7.6 gm/dL    Albumin Level 2.0 (L) 3.4 - 4.8 g/dL    Globulin 4.7 (H) 2.4 - 3.5 gm/dL    Albumin/Globulin Ratio 0.4 (L) 1.1 - 2.0 ratio    Bilirubin Total 0.5 <=1.5 mg/dL    Alkaline Phosphatase 78 40 - 150 unit/L    Alanine Aminotransferase 22 0 - 55 unit/L    Aspartate Aminotransferase 18 5 - 34 unit/L    eGFR 36 mls/min/1.73/m2   CBC with Differential    Collection Time: 01/03/23  4:47 AM   Result Value Ref Range    WBC 8.1 4.5 - 11.5 x10(3)/mcL    RBC 3.96 (L) 4.70 - 6.10 x10(6)/mcL    Hgb 11.6 (L) 14.0 - 18.0 gm/dL    Hct 36.0 (L) 42.0 - 52.0 %    MCV 90.9 80.0 - 94.0 fL    MCH 29.3 pg    MCHC 32.2 (L) 33.0 - 36.0 mg/dL    RDW 16.0 (H) 11.6 - 14.4 %    Platelet 206 140 - 371 x10(3)/mcL    MPV 10.6 9.4 - 12.4 fL    IG# 0.47 (H) 0 - 0.04 x10(3)/mcL    IG% 5.8 %    NRBC% 0.0 0 - 1 %   Manual Differential    Collection Time: 01/03/23  4:47 AM   Result Value Ref Range    Neut Man 94 %    Lymph Man 4 %    Myelo Man 2 %    Instr WBC 8 x10(3)/mcL    Abs Lymp 0.32 (L) 0.6 - 4.6 x10(3)/mcL    Abs Neut 7.68 2.1 - 9.2 x10(3)/mcL    RBC Morph Abnormal (A) Normal    Anisocyte 1+ (A) (none)    Poik 2+ (A) (none)    Macrocyte 1+ (A) (none)    Troutman Cells 2+ (A) (none)    Platelet Est Adequate Normal, Adequate   POCT glucose    Collection Time: 01/03/23 12:19 PM   Result Value Ref Range    POCT Glucose 155 (H) 70 - 110 mg/dL         Assessment:  -COVID with hypoxemic resp failure  -RLE DVT; anticoagulation on hold  -Gross hematuria; CT shows bladder wall thickening and possible cystitis   -BPH on Flomax and Proscar  -LAURA on CKD      Plan:  Sound like the family has  decided on hospice however, can start CBI to flush out old clot to assure comfort. Will follow.    MARION Puentes

## 2023-01-03 NOTE — CONSULTS
Inpatient consult to Palliative Care  Consult performed by: Yasir Hoang MD  Consult ordered by: Deangelo Cabello MD    Patient Name: Caryn Lorenzo   MRN: [id number]   Admission Date: 12/28/2022   Hospital Length of Stay: 6   Attending Provider: Jose Mccauley MD   Consulting Provider: Yasir Hoang M.D.  Reason for Consult: Goals of Care  Primary Care Physician: Primary Doctor No     Principal Problem: Pneumonia due to COVID-19 virus     Patient information was obtained from patient, relative(s), ER records, and primary team.      Final diagnoses:  [J18.9] Pneumonia  [U07.1, J12.82] Pneumonia due to COVID-19 virus (Primary)  [J96.01] Acute hypoxemic respiratory failure  [A41.9] Sepsis, due to unspecified organism, unspecified whether acute organ dysfunction present  [N17.9, N18.9] Acute kidney injury superimposed on chronic kidney disease  [E13.65] Other specified diabetes mellitus with hyperglycemia, unspecified whether long term insulin use     Assessment/Plan:     I reviewed the patient and family's understanding of the seriousness of the illness and its expected prognosis. We discussed the patient's goals of care and treatment preferences. We discussed the difference between palliative and curative medicine. I explained the benefits of home hospice care. I clarified current code status. I identified the surrogate decision maker or health care POA. (Son has general POA only, but patient defers to son as surrogate for all healthcare decisions.) I explained the difference between a living will (advanced directive) and LaPost. We discussed the patient's chosen code status as listed above and the contents of the LaPOST. I answered all questions and we formulated a plan including recommendations for symptom management and how to best achieve goals of care.     I reviewed the patient's current clinical status with the nurse. I reviewed clinical documentation, labs and imaging.     Symptom management  review: Patient denies pain, dyspnea, nausea. He admits to sore throat only.    During my conversation, patient's O2 sat dropped to 85% on 4LNC . He was asymptomatic.    Advance Care Planning     Date: 01/03/2023    Code Status  In light of the patients advanced and life limiting illness,I engaged the the patient and family in a conversation about the patient's preferences for care  at the very end of life. The patient wishes to have a natural, peaceful death.  Along those lines, the patient does not wish to have CPR or other invasive treatments performed when his heart and/or breathing stops. I communicated to the patient and family that a LaPOST form was completed to reflect other EOL preferences of the patient such as Section C. No artificial nutrition by tube. Section B. Selective treatment options.       [facility]  I engaged the patient and family in a conversation about advance care planning and we specifically addressed what the goals of care would be moving forward, in light of the patient's change in clinical status, specifically Increased weakness, bleeding with DVT and unable to anticoagulate, worsening systolic CHF, worsening oropharyngeal dysphagia.  We did specifically address the patient's likely prognosis, which is poor.  We explored the patient's values and preferences for future care.  The patient and family endorses that what is most important right now is to focus on avoiding the hospital, symptom/pain control, quality of life, even if it means sacrificing a little time, improvement in condition but with limits to invasive therapies, and comfort and QOL     Accordingly, we have decided that the best plan to meet the patient's goals includes enrolling in hospice care    I did explain the role for hospice care at this stage of the patient's illness, including its ability to help the patient live with the best quality of life possible.  We will be making a hospice referral.       History of Present  Illness:     83 y/o BM h/o CKDIIIb, Dm2, Anemia, cognitive deficits who lives in a NH. He is admitted with Covid virus with complications of acute hypoxic respiratory failure, acute on chronic kidney disease, decompensated systolic CHF, recurrent anemia, DVT, dysphagia and generalized weakness. We were consulted to review goals of care of patient and family.      Active Ambulatory Problems     Diagnosis Date Noted    No Active Ambulatory Problems     Resolved Ambulatory Problems     Diagnosis Date Noted    No Resolved Ambulatory Problems     Past Medical History:   Diagnosis Date    BPH (benign prostatic hyperplasia)     Cognitive communication deficit         History reviewed. No pertinent surgical history.     Review of patient's allergies indicates:  No Known Allergies       Current Facility-Administered Medications:     Amino acid 4.25% - dextrose 5% (CLINIMIX-E) solution (1L provides 42.5 gm AA, 50 gm CHO (170 kcal/L dextrose), Na 35, K 30, Mg 5, Ca 4.5, Acetate 70, Cl 39, Phos 15), , Intravenous, Continuous, Deangelo Cabello MD, Last Rate: 50 mL/hr at 01/02/23 1531, New Bag at 01/02/23 1531    ascorbic acid (vitamin C) tablet 500 mg, 500 mg, Oral, BID, Jose Mccauley MD, 500 mg at 01/02/23 2142    aspirin EC tablet 81 mg, 81 mg, Oral, Daily, Chula Solis MD, 81 mg at 01/02/23 0830    atorvastatin tablet 20 mg, 20 mg, Oral, Daily, Chula Solis MD, 20 mg at 01/02/23 0830    dexAMETHasone tablet 6 mg, 6 mg, Oral, Daily, Jose Mccauley MD, 6 mg at 01/02/23 0830    dextrose 10% bolus 125 mL 125 mL, 12.5 g, Intravenous, PRN, Jose Mccauley MD    dextrose 10% bolus 250 mL 250 mL, 25 g, Intravenous, PRN, Jose Mccauley MD    doxycycline tablet 100 mg, 100 mg, Oral, Q12H, Chula Solis MD, 100 mg at 01/02/23 2141    finasteride tablet 5 mg, 5 mg, Oral, Daily, Jose Mccauley MD, 5 mg at 01/02/23 0830    glucagon (human recombinant) injection 1 mg, 1 mg, Intramuscular, PRN,  Joes Mccauley MD    glucose chewable tablet 16 g, 16 g, Oral, PRN, Jose Mccauley MD    glucose chewable tablet 24 g, 24 g, Oral, PRN, Jose Mccauley MD    guaiFENesin 100 mg/5 ml syrup 200 mg, 200 mg, Oral, Q4H PRN, Deangelo Cabello MD    hydrALAZINE injection 5 mg, 5 mg, Intravenous, Q8H PRN, Deangelo Cabello MD, 5 mg at 01/02/23 1739    insulin aspart U-100 injection 0-5 Units, 0-5 Units, Subcutaneous, QID (AC + HS) PRN, Jose Mccauley MD, 2 Units at 01/02/23 1639    insulin aspart U-100 injection 1-10 Units, 1-10 Units, Subcutaneous, QID (AC + HS) PRN, Jose Mccauley MD, 1 Units at 01/02/23 2325    LETS (LIDOcaine-TETRAcaine-EPINEPHrine) gel solution, , Topical (Top), Once, Sury Ritter, MARION    loperamide capsule 2 mg, 2 mg, Oral, QID PRN, Deangelo Cabello MD, 2 mg at 01/02/23 0830    melatonin tablet 6 mg, 6 mg, Oral, Nightly PRN, Jose Mccauley MD, 6 mg at 01/01/23 2316    metoprolol succinate (TOPROL-XL) 24 hr tablet 25 mg, 25 mg, Oral, Daily, Chula Solis MD, 25 mg at 01/02/23 0830    mirtazapine tablet 15 mg, 15 mg, Oral, QHS, Jose Mccauley MD, 15 mg at 01/02/23 2142    perflutren lipid microspheres injection 1.3 mL, 1.3 mL, Intravenous, Once, Jose Mccauley MD    piperacillin-tazobactam (ZOSYN) 4.5 g in dextrose 5 % in water (D5W) 5 % 100 mL IVPB (MB+), 4.5 g, Intravenous, Q8H, Jose Mccauley MD, Stopped at 01/03/23 0525    QUEtiapine tablet 25 mg, 25 mg, Oral, BID, Deangelo Cabello MD, 25 mg at 01/02/23 2142    sodium chloride 0.9% flush 10 mL, 10 mL, Intravenous, PRN, Jose Mccauley MD    tamsulosin 24 hr capsule 0.4 mg, 0.4 mg, Oral, Daily, Jose Mccauley MD, 0.4 mg at 01/02/23 0830    vitamin D 1000 units tablet 5,000 Units, 5,000 Units, Oral, Daily, Jose Mccauley MD, 5,000 Units at 01/02/23 0830    zinc sulfate capsule 220 mg, 220 mg, Oral, Daily, Jose Mccauley MD, 220 mg at 01/02/23 0830  "    dextrose 10%, dextrose 10%, glucagon (human recombinant), glucose, glucose, guaiFENesin 100 mg/5 ml, hydrALAZINE, insulin aspart U-100, insulin aspart U-100, loperamide, melatonin, sodium chloride 0.9%     History reviewed. No pertinent family history.     Review of Systems   Constitutional:  Positive for activity change.   HENT:  Positive for sore throat.    Respiratory:  Negative for shortness of breath.    Cardiovascular:  Negative for chest pain and leg swelling.   Gastrointestinal:  Negative for abdominal distention, abdominal pain, diarrhea and nausea.   Genitourinary:  Negative for difficulty urinating.   Musculoskeletal:  Negative for arthralgias and back pain.   Psychiatric/Behavioral:  Negative for agitation and dysphoric mood.           Objective:   BP (!) 154/75   Pulse 64   Temp 96.1 °F (35.6 °C) (Axillary)   Resp (!) 22   Ht 6' 1" (1.854 m)   Wt 82.1 kg (181 lb)   SpO2 95%   BMI 23.88 kg/m²      Physical Exam  Vitals reviewed.   Constitutional:       General: He is not in acute distress.     Appearance: He is ill-appearing. He is not toxic-appearing.   HENT:      Head: Normocephalic.      Right Ear: External ear normal.      Left Ear: External ear normal.      Nose: Nose normal.      Mouth/Throat:      Mouth: Mucous membranes are moist.      Pharynx: Oropharynx is clear.   Eyes:      Extraocular Movements: Extraocular movements intact.      Conjunctiva/sclera: Conjunctivae normal.      Pupils: Pupils are equal, round, and reactive to light.   Cardiovascular:      Rate and Rhythm: Normal rate and regular rhythm.      Pulses: Normal pulses.      Heart sounds: Normal heart sounds.   Pulmonary:      Effort: Pulmonary effort is normal.      Breath sounds: Normal breath sounds.   Abdominal:      General: Abdomen is flat. Bowel sounds are normal. There is no distension.      Palpations: Abdomen is soft.   Musculoskeletal:      Right lower leg: No edema.      Left lower leg: No edema. "   Neurological:      Mental Status: He is alert. He is disoriented.      Comments: Oriented to self, not place, situation or time. He is aware of children's names and nursing home that he lives in. He was not aware of the death of his wife (2 months ago, per son).    Motor strength RLE 3/5, LLE 2/5, BUE 4/5          Review of Symptoms  Review of Symptoms      Symptom Assessment (ESAS 0-10 Scale)  Pain:  0  Dyspnea:  0  Anxiety:  0  Nausea:  0  Depression:  0  Anorexia:  0  Fatigue:  0  Insomnia:  0  Restlessness:  0  Agitation:  0     CAM / Delirium:  Negative  Constipation:  Negative  Diarrhea:  Negative      Bowel Management Plan (BMP):  Yes      Pain Assessment:  OME in 24 hours:  0  Location(s):      Modified Vandana Scale:  0    Performance Status:  30    Living Arrangements:  Lives in nursing home    Psychosocial/Cultural:   See Palliative Psychosocial Note: Yes  **Primary  to Follow**  Palliative Care  Consult: No    Spiritual:  F - Chapis and Belief:  Moravian    Advance Care Planning   Advance Directives:   Living Will: No    LaPOST: Yes    Do Not Resuscitate Status: Yes    Medical Power of : No    Agent's Name:  SonStar Lorenzo   Agent's Contact Number:  [phone]    Decision Making:  Patient answered questions and Family answered questions  Goals of Care: The patient and family endorses that what is most important right now is to focus on avoiding the hospital, quality of life, even if it means sacrificing a little time, improvement in condition but with limits to invasive therapies, and comfort and QOL     Accordingly, we have decided that the best plan to meet the patient's goals includes enrolling in hospice care          Caregiver burden formerly assessed: yes        > 50% of 70 min of encounter was spent in chart review, face to face discussion of goals of care, symptom assessment, coordination of care and emotional support.     An additional 28 min of time was  spent in Advanced Care Planning discussion    Yasir Hoang M.D.  Palliative Medicine  [facility]

## 2023-01-03 NOTE — CONSULTS
[facility]                       [address] [id number]    PATIENT NAME:       SAMUEL REYNOLDS   YOB: 1940  CSN:                [id number]   MRN:                [id number]  ADMIT DATE:         12/28/2022 11:59:00  PHYSICIAN:          Elian Casanova MD                            CONSULTATION    DATE OF CONSULT:      REASON FOR CONSULTATION:  Gross hematuria.    HISTORY OF PRESENT ILLNESS:  82-year-old male who presented to the emergency   room with shortness of breath.  Recently was diagnosed with a COVID infection   and he was hypotensive and hypothermic in the emergency room.  He has been   admitted, currently undergoing treatment for a right lower extremity DVT.  He   was started on anticoagulation therapy and subsequently developed gross   hematuria in his catheter.  His urinalysis on admission has blood but no   infection.    IMAGING:  He has an ultrasound without hydronephrosis.     He had a CT noncontrast of the chest which revealed worsening infiltrates,   possibility of aspiration pneumonia.    ALLERGIES:  NO KNOWN DRUG ALLERGIES.     PAST MEDICAL HISTORY:  CKD 3, type 2 diabetes, hypertension, hyperuricemia,   anemia of chronic disease, and cognitive deficit.    PAST SURGICAL HISTORY:  None.    MEDICATIONS:  At the nursing home:  1. Amlodipine.  2. Ascorbic acid/vitamin C.  3. Benazepril.  4. Decadron.  5. Proscar.  6. Actos.  7. Potassium chloride.  8. Flomax.  9. Vitamin D.  10. Zinc sulfate.  11. Lasix.  12. Remeron.  13. Seroquel.    14. He is also remdesivir for COVID.    PHYSICAL EXAMINATION:  He is in no distress.  Catheter is in place, draining grossly bloody urine.    LABORATORY DATA:  His hemoglobin is stable.      Creatinine 1.97; this is his baseline.    Renal ultrasound:  Cortical thinning of the renal parenchyma, but no masses,   lesions, stones, or hydronephrosis.    ASSESSMENT:     1. Gross hematuria, likely secondary to anticoagulation for his deep venous   thrombosis.   2. BPH, on maximal medical therapy.    RECOMMENDATIONS:  Will get a noncontrast CT to ensure he does not have any   masses or bladder lesions.  In all likelihood, this is just bleeding due to the   anticoagulation, from the prostate.  Will manage this conservatively.  As long   as his urine is draining in the Pelletier, I would not do anything except hand   irrigate if needed.  If it worsens, then we can start a CBI and a large-bore   3-way catheter.  Hopefully, we can avoid a trip to the operating room and manage   this conservatively.    Thank you very much for allowing me to participate in his care.  I will follow   with you.        ______________________________  Elian Casanova MD    TAB/AQS  DD:  01/02/2023  Time:  06:43PM  DT:  01/02/2023  Time:  07:03PM  Job #:  [id number]      CONSULTATION

## 2023-01-03 NOTE — PT/OT/SLP PROGRESS
Attempted to see pt for readiness for a MBS; however, pt being attended to by nursing for personal/medical care. Will re-attempt at later time schedule permitting.

## 2023-01-03 NOTE — CONSULTS
83 yo with covid, pnuemonia and LE DVT.   He was started on anticoagulation and developed gross hematuria.   He has pérez and is draining.   I will get a noncontrast CT.   IF clotting occurs he may need 24 fr 3 way pérez and CBI.  Will follow  Dictated

## 2023-01-04 LAB
BACTERIA UR CULT: NO GROWTH
PROCALCITONIN SERPL-MCNC: 0.98 NG/ML

## 2023-01-04 PROCEDURE — 27000207 HC ISOLATION

## 2023-01-04 PROCEDURE — 92526 ORAL FUNCTION THERAPY: CPT

## 2023-01-04 PROCEDURE — 27000221 HC OXYGEN, UP TO 24 HOURS

## 2023-01-04 PROCEDURE — 25000003 PHARM REV CODE 250: Performed by: INTERNAL MEDICINE

## 2023-01-04 PROCEDURE — 99233 PR SUBSEQUENT HOSPITAL CARE,LEVL III: ICD-10-PCS | Mod: CR,,, | Performed by: INTERNAL MEDICINE

## 2023-01-04 PROCEDURE — 97535 SELF CARE MNGMENT TRAINING: CPT

## 2023-01-04 PROCEDURE — 21400001 HC TELEMETRY ROOM

## 2023-01-04 PROCEDURE — 63600175 PHARM REV CODE 636 W HCPCS: Performed by: INTERNAL MEDICINE

## 2023-01-04 PROCEDURE — 99233 SBSQ HOSP IP/OBS HIGH 50: CPT | Mod: CR,,, | Performed by: INTERNAL MEDICINE

## 2023-01-04 RX ORDER — MORPHINE SULFATE 15 MG/1
15 TABLET ORAL EVERY 6 HOURS PRN
Status: DISCONTINUED | OUTPATIENT
Start: 2023-01-05 | End: 2023-01-05 | Stop reason: HOSPADM

## 2023-01-04 RX ADMIN — PIPERACILLIN AND TAZOBACTAM 4.5 G: 4; .5 INJECTION, POWDER, FOR SOLUTION INTRAVENOUS; PARENTERAL at 05:01

## 2023-01-04 NOTE — NURSING
Notified Dr. Cabello that patient's HR dropped to 23 bpm, I was unable to obtain a temperature. Vitals assessed. And patient's breathing pattern has changed. He spoke to pt's son Jose and informed him of the patient's current condition. He also asked if the family was able to come to the hospital to visit with the patient due to change in condition.

## 2023-01-04 NOTE — PLAN OF CARE
Patient has now has a change in condition and is appropriate for inpatient hospice. Notified by Dr Hoang that son is now agreeable to hospice. Spoke to son Jose via phone and he confirmed that he is agreeable. Referral sent via [facility].

## 2023-01-04 NOTE — PROGRESS NOTES
[facility]  Hospital Medicine Progress Note        Chief Complaint: Inpatient Follow-up for covid    HPI:   82-year-old male with significant if chronic kidney IIIb, HTN, hyperuricemia, type 2 diabetes mellitus, anemia of chronic disease, cognitive deficit presented to the ED from nursing home with complaints of shortness of breath.  Patient was recently diagnosed with COVID-19 infection.  Patient was borderline hypotensive, tachypneic, hypothermic in the ED.  Placed on Gerri Hugger.  Oxygen supplementation given and hypoxemia improved.  Lab significant for severe leukocytosis, mildly elevated troponin which later trended up.  Patient also had worsening renal insufficiency compared to baseline.  COVID-19 PCR positive.  Chest x-ray with prominent interstitial markings.  Patient was initiated on broad-spectrum antimicrobials, IV fluids and was admitted to hospitalist medicine service.  TTE showed worsened EF 20-25% and possible LV thrombus.  Cardiology again reviewed the echo determine there is no LV thrombus.  Recommended to have outpatient ischemic workup,Given COVID and requiring supplemental oxygen.  No life vest due to dementia.  Also recommended to avoid diuretics due to abnormal renal indices.      Given persistent hypoxia, obtain CT chest noncontrast which revealed bilateral dependent worsening of infiltrates representing possible aspiration pneumonia    Labs showed elevated inflammatory markers.    Patient developed bradycardia after starting remdesivir yesterday evening.  Remdesivir stopped.      DVT in the right lower extremity noted.  Started on heparin drip. Patient was started on heparin drip for lower extremity DVT given renal dysfunction, patient developed gross hematuria.   Also patient was noted to have multiple episodes loose bowel movements.  Fecal leukocytes negative.  Possibly related to COVID, antibiotics related.       Interval Hx:   Discussed with family in detail  regarding events.  Patient pulled out Pelletier.  But prior to that noticed across hematuria in the Pelletier bag.  Oxygen requirements increased overnight.     Objective/physical exam:  General:  Appears comfortably resting on bed   Chest:  Decreased breath sounds with rales rhonchi , tachypnea noted on nasal cannula 5 L saturating 95%   Heart:  Regular rate and rhythm  Abdomen: Soft, nontender, BS +  MSK:  Mild Pedal edema left greater than right    Neurologic:  Alert but disoriented, moving all extremities    VITAL SIGNS: 24 HRS MIN & MAX LAST   Temp  Min: 96.1 °F (35.6 °C)  Max: 97 °F (36.1 °C) 96.1 °F (35.6 °C)   BP  Min: 131/77  Max: 161/86 131/77   Pulse  Min: 63  Max: 73  64   Resp  Min: 22  Max: 22 (!) 22     SpO2  Min: 93 %  Max: 95 % (!) 93 %       Recent Labs   Lab 01/03/23  0447   WBC 8.1   RBC 3.96*   HGB 11.6*   HCT 36.0*   MCV 90.9   MCH 29.3   MCHC 32.2*   RDW 16.0*      MPV 10.6         Recent Labs   Lab 01/02/23  1014 01/03/23  0447   NA  --  148*   K  --  4.3   CO2  --  20*   BUN  --  69.3*   CREATININE  --  1.86*   CALCIUM  --  9.6   PH 7.46*  --    ALBUMIN  --  2.0*   ALKPHOS  --  78   ALT  --  22   AST  --  18   BILITOT  --  0.5          Microbiology Results (last 7 days)       Procedure Component Value Units Date/Time    Stool Culture [[id number]]  (Abnormal) Collected: 01/01/23 1638    Order Status: Completed Specimen: Stool Updated: 01/03/23 1223     Stool Culture Negative for Salmonella, Shigella, Campylobacter, Vibrio, Aeromonas, Pleisiomonas,Yersinia, or Shiga Toxin 1 and 2.      Moderate Yeast    Urine culture [[id number]] Collected: 01/02/23 1312    Order Status: Completed Specimen: Urine, Clean Catch Updated: 01/03/23 0652     Urine Culture No Growth At 24 Hours    Blood Culture #2 **CANNOT BE ORDERED STAT** [[id number]]  (Normal) Collected: 12/28/22 1226    Order Status: Completed Specimen: Blood Updated: 01/02/23 1401     CULTURE, BLOOD ([facility]) No Growth at 5 days    Blood Culture #1  **CANNOT BE ORDERED STAT** [[id number]]  (Normal) Collected: 12/28/22 1217    Order Status: Completed Specimen: Blood Updated: 01/02/23 1401     CULTURE, BLOOD ([facility]) No Growth at 5 days    Urine culture [[id number]] Collected: 12/29/22 1436    Order Status: Completed Specimen: Urine Updated: 12/31/22 1121     Urine Culture No Growth             Scheduled Med:   ascorbic acid (vitamin C)  500 mg Oral BID    aspirin  81 mg Oral Daily    atorvastatin  20 mg Oral Daily    dexAMETHasone  6 mg Oral Daily    doxycycline  100 mg Oral Q12H    finasteride  5 mg Oral Daily    metoprolol succinate  25 mg Oral Daily    mirtazapine  15 mg Oral QHS    perflutren lipid microspheres  1.3 mL Intravenous Once    piperacillin-tazobactam (ZOSYN) IVPB  4.5 g Intravenous Q8H    QUEtiapine  25 mg Oral BID    tamsulosin  0.4 mg Oral Daily    vitamin D  5,000 Units Oral Daily    zinc sulfate  220 mg Oral Daily          Assessment/Plan:  Severe COVID-19 pneumonitis   Decompensated Heart failure with reduced ejection fraction  Possible aspiration pneumonia based on CT finding  Acute hypoxemic respiratory failure secondary to above   Sirs-sepsis secondary to COVID-19 versus superimposed bacterial infection   Acute right lower extremity DVT  Acute on chronic kidney disease, stage IIIB  NSTEMI-possible type 2  Hypernatremia  Hyperglycemia in the setting of Type 2 diabetes mellitus on glucocorticoids A1c 5.2%  Diarrhea possibly viral, antibiotics related  Essential HTN-stable   Hyperuricemia   Anemia of chronic disease       Palliative team on board.  Appreciate recommendations.  Family in agreement with hospice referral.    Urology was consulted for gross hematuria.  CT abdomen obtained which showed thickened urinary bladder wall.  Given hospice situation, recommended to start CBI to flush showed old clot to ensure comfort.    ABG from yesterday did show hypoxemia.  Continue supplemental oxygen.    Keep NPO until speech eval  Continue  aspiration  precautions      Code status : DNI on hospice    DVT prophylaxis-SCDs  Critical care time spent:  50 minutes   Critical care diagnosis:   Acute hypoxic respiratory failure, COVID, congestive heart failure    Deangelo Cabello MD   01/03/2023

## 2023-01-04 NOTE — CONSULTS
ConsultsPatient Name: Caryn Lorenzo   MRN: [id number]   Admission Date: 12/28/2022   Hospital Length of Stay: 7   Attending Provider: Jose Mccauley MD   Consulting Provider: Yasir Hoang M.D.  Reason for Consult: Goals of Care  Primary Care Physician: Primary Doctor No     Principal Problem: Pneumonia due to COVID-19 virus     Patient information was obtained from patient, relative(s), ER records, and primary team.      Final diagnoses:  [J18.9] Pneumonia  [U07.1, J12.82] Pneumonia due to COVID-19 virus (Primary)  [J96.01] Acute hypoxemic respiratory failure  [A41.9] Sepsis, due to unspecified organism, unspecified whether acute organ dysfunction present  [N17.9, N18.9] Acute kidney injury superimposed on chronic kidney disease  [E13.65] Other specified diabetes mellitus with hyperglycemia, unspecified whether long term insulin use     Assessment/Plan:     I reviewed the patient and family's understanding of the seriousness of the illness and its expected prognosis. We discussed the patient's goals of care and treatment preferences. I reviewed the patient's current clinical status with the nurse. I reviewed clinical documentation, labs and imaging. I explained to the patient's son, Jose that since this AM, the patient has declined and has become lethargic and difficult to communicate with. In fact, at present he is obtunded. His O2 sat is 95% on 6LNC, and VSS at present, but he had a short run of bradycardia earlier today with rate in the 20s. Speech therapy attempted to do bed side swallow, unsuccessfully. She felt that MBS may be too risky with resultant aspiration pneumonia. I recommended inpatient hospice transfer and a focus on comfort-based care over further aggressive treatment measures. Jose is now in favor of transfer to inpatient hospice. The patient eligible for acute respiratory failure s/p COVID, aspiration pneumonia with sepsis, decompensated CHF, encephalopathy, inability to swallow  safely, requires IV meds to manage symptoms. I expect prognosis <2 weeks.    Symptom management review: Unable to communicate with the patient at   present.    Code status: DNR/DNI    History of Present Illness:     83 y/o BM h/o CKDIIIb, Dm2, Anemia, cognitive deficits who lives in a NH. He is admitted with Covid virus with complications of acute hypoxic respiratory failure, acute on chronic kidney disease, decompensated systolic CHF, recurrent anemia, DVT, dysphagia and generalized weakness. We were consulted to review goals of care of patient and family.      Active Ambulatory Problems     Diagnosis Date Noted    No Active Ambulatory Problems     Resolved Ambulatory Problems     Diagnosis Date Noted    No Resolved Ambulatory Problems     Past Medical History:   Diagnosis Date    BPH (benign prostatic hyperplasia)     Cognitive communication deficit         History reviewed. No pertinent surgical history.     Review of patient's allergies indicates:  No Known Allergies       Current Facility-Administered Medications:     ascorbic acid (vitamin C) tablet 500 mg, 500 mg, Oral, BID, Jose Mccauley MD, 500 mg at 01/03/23 2157    aspirin EC tablet 81 mg, 81 mg, Oral, Daily, Chula Solis MD, 81 mg at 01/02/23 0830    atorvastatin tablet 20 mg, 20 mg, Oral, Daily, Chula Solis MD, 20 mg at 01/02/23 0830    dexAMETHasone tablet 6 mg, 6 mg, Oral, Daily, Jose Mccauley MD, 6 mg at 01/02/23 0830    dextrose 10% bolus 125 mL 125 mL, 12.5 g, Intravenous, PRN, Jose Mccauley MD    dextrose 10% bolus 250 mL 250 mL, 25 g, Intravenous, PRN, Jose Mccauley MD    doxycycline tablet 100 mg, 100 mg, Oral, Q12H, Deangelo Cabello MD, 100 mg at 01/03/23 2157    finasteride tablet 5 mg, 5 mg, Oral, Daily, Jose Mccauley MD, 5 mg at 01/02/23 0830    glucagon (human recombinant) injection 1 mg, 1 mg, Intramuscular, PRN, Jose Mccauley MD    glucose chewable tablet 16 g, 16 g, Oral, PRN,  Jose Mccauley MD    glucose chewable tablet 24 g, 24 g, Oral, PRN, Jose Mccauley MD    guaiFENesin 100 mg/5 ml syrup 200 mg, 200 mg, Oral, Q4H PRN, Deangelo Cabello MD    hydrALAZINE injection 5 mg, 5 mg, Intravenous, Q8H PRN, Deangelo Cabello MD, 5 mg at 01/02/23 1739    insulin aspart U-100 injection 0-5 Units, 0-5 Units, Subcutaneous, QID (AC + HS) PRN, Jose Mccauley MD, 2 Units at 01/02/23 1639    insulin aspart U-100 injection 1-10 Units, 1-10 Units, Subcutaneous, QID (AC + HS) PRN, Jose Mccauley MD, 1 Units at 01/02/23 2325    loperamide capsule 2 mg, 2 mg, Oral, QID PRN, Deangelo Cabello MD, 2 mg at 01/02/23 0830    melatonin tablet 6 mg, 6 mg, Oral, Nightly PRN, Jose Mccauley MD, 6 mg at 01/03/23 2156    metoprolol succinate (TOPROL-XL) 24 hr tablet 25 mg, 25 mg, Oral, Daily, Chula Solis MD, 25 mg at 01/02/23 0830    perflutren lipid microspheres injection 1.3 mL, 1.3 mL, Intravenous, Once, Jose Mccauley MD    piperacillin-tazobactam (ZOSYN) 4.5 g in dextrose 5 % in water (D5W) 5 % 100 mL IVPB (MB+), 4.5 g, Intravenous, Q8H, Deangelo Cabello MD, Last Rate: 25 mL/hr at 01/04/23 0525, 4.5 g at 01/04/23 0525    sodium chloride 0.9% flush 10 mL, 10 mL, Intravenous, PRN, Jose Mccauley MD    tamsulosin 24 hr capsule 0.4 mg, 0.4 mg, Oral, Daily, Jose Mccauley MD, 0.4 mg at 01/02/23 0830    vitamin D 1000 units tablet 5,000 Units, 5,000 Units, Oral, Daily, Jose Mccauley MD, 5,000 Units at 01/02/23 0830     dextrose 10%, dextrose 10%, glucagon (human recombinant), glucose, glucose, guaiFENesin 100 mg/5 ml, hydrALAZINE, insulin aspart U-100, insulin aspart U-100, loperamide, melatonin, sodium chloride 0.9%     History reviewed. No pertinent family history.     Review of Systems   Unable to perform ROS: Mental status change   Constitutional:  Positive for activity change.   HENT:  Positive for sore throat.    Respiratory:  Negative for  "shortness of breath.    Cardiovascular:  Negative for chest pain and leg swelling.   Gastrointestinal:  Negative for abdominal distention, abdominal pain, diarrhea and nausea.   Genitourinary:  Negative for difficulty urinating.   Musculoskeletal:  Negative for arthralgias and back pain.   Psychiatric/Behavioral:  Negative for agitation and dysphoric mood.           Objective:   BP (!) 150/66   Pulse 73   Temp 97.9 °F (36.6 °C) (Axillary)   Resp (!) 22   Ht 6' 1" (1.854 m)   Wt 82.1 kg (181 lb)   SpO2 97%   BMI 23.88 kg/m²      Physical Exam  Vitals reviewed.   Constitutional:       General: He is not in acute distress.     Appearance: He is ill-appearing. He is not toxic-appearing.   HENT:      Head: Normocephalic.      Right Ear: External ear normal.      Left Ear: External ear normal.      Nose: Nose normal.      Mouth/Throat:      Mouth: Mucous membranes are moist.      Pharynx: Oropharynx is clear.   Eyes:      Extraocular Movements: Extraocular movements intact.      Conjunctiva/sclera: Conjunctivae normal.      Pupils: Pupils are equal, round, and reactive to light.   Cardiovascular:      Rate and Rhythm: Normal rate and regular rhythm.      Pulses: Normal pulses.      Heart sounds: Normal heart sounds.   Pulmonary:      Effort: Pulmonary effort is normal.      Breath sounds: Normal breath sounds.   Abdominal:      General: Abdomen is flat. Bowel sounds are normal. There is no distension.      Palpations: Abdomen is soft.   Musculoskeletal:      Right lower leg: No edema.      Left lower leg: No edema.   Neurological:      Mental Status: He is disoriented.      Comments: Oriented to self, not place, situation or time. He is aware of children's names and nursing home that he lives in. He was not aware of the death of his wife (2 months ago, per son).    Motor strength RLE 3/5, LLE 2/5, BUE 4/5          Review of Symptoms  Review of Symptoms      Symptom Assessment (ESAS 0-10 Scale)  Unable to complete " assessment due to Acuity of condition     CAM / Delirium:  Positive  Constipation:  Negative  Diarrhea:  Negative      Bowel Management Plan (BMP):  Yes      Pain Assessment:  OME in 24 hours:  0  Location(s):      Pain Assessment in Advanced Demential Scale (PAINAD)   Breathing - Independent of vocalization:  0  Negative vocalization:  0  Facial expression:  0  Body language:  0  Consolability:  0  Total:  0    Modified Vandana Scale:  0    Performance Status:  30    Living Arrangements:  Lives in nursing home    Psychosocial/Cultural:   See Palliative Psychosocial Note: Yes  **Primary  to Follow**  Palliative Care  Consult: No    Spiritual:  F - Chapis and Belief:  Buddhist    Advance Care Planning   Advance Directives:   Living Will: No    LaPOST: Yes    Do Not Resuscitate Status: Yes    Medical Power of : No    Agent's Name:  SonStar Lorenzo   Agent's Contact Number:  [phone]    Decision Making:  Patient answered questions and Family answered questions  Goals of Care:   Accordingly, we have decided that the best plan to meet the patient's goals includes enrolling in hospice care          Caregiver burden formerly assessed: yes        > 50% of 35 min of encounter was spent in chart review, face to face discussion of goals of care, symptom assessment, coordination of care and emotional support.     Yasir Hoang M.D.  Palliative Medicine  [facility]

## 2023-01-04 NOTE — PLAN OF CARE
Spoke to Constanza with [facility]. Son is out of town and there is no other family available. He will be here tomorrow at 2pm to meet with hospice.

## 2023-01-04 NOTE — PT/OT/SLP PROGRESS
Speech Language Pathology Department  Dysphagia Therapy Progress Note    Patient Name:  Caryn Lorenzo   MRN:  [id number]  Admitting Diagnosis: Pneumonia due to COVID-19 virus    Recommendations:     General recommendations:  Modified Barium Swallow Study  Diet recommendations:  NPO, Liquid Diet Level: NPO   Aspiration precautions: medications crushed in puree    Discharge recommendations:  nursing facility, skilled, nursing facility, basic   Barriers to safe discharge:  acuity of illness    Subjective     Patient alert and flat.    Pain/Comfort: Pain Rating 1: 0/10    Spiritual/Cultural/Yazdanism Beliefs/Practices that affect care: no    Respiratory Status: nasal cannula    Objective:     Oral Musculature Evaluation:  Oral Musculature: general weakness  Dentition: scattered dentition, teeth in poor condition  Secretion Management: adequate  Mucosal Quality: dry  Mandibular Strength and Mobility: WFL  Oral Labial Strength and Mobility: WFL    Therapeutic Activities:  Pt participated PO trials of mildly thick liquids and puree solids to assess his readiness for a possible MBS. Pt with immediate cough response, increased work of breathing and respiratory rate, and desaturation to the high 80s following a large bolus trial of mildly thick liquid by straw presentation. Breathing quality transitioned to a wet gurgly presentation following this suspected aspiration event. He was provided with with two other trials prior to this event including a puree solid trial x1 and a mildly thick liquid trial by spoon x1 one. No coughing or choking was appreciated.     Assessment:     Pt continues to demonstrate signs/symptoms of aspiration with PO trials. After discussing findings with pt's son, this AM, pt's son requested to continue with a comprehensive evaluation of his swallow function despite educate regarding high risk for aspiration and suspected poor swallow function at this time.     Goals:   Multidisciplinary Problems        SLP Goals          Problem: SLP    Goal Priority Disciplines Outcome   SLP Goal     SLP Ongoing, Progressing   Description: LTG: Pt will tolerate least restrictive PO diet with no clinical signs/sx aspiration    STGs:  Pt will tolerate mild liquids by cup/straw with no clinical signs/sx aspiration.  Pt will tolerate puree solids with no clinical signs/symptoms of aspiration.  Pt will participate in a modified barium swallow study when appropriate.                        Patient Education:     Patient and son/s provided with verbal education regarding result/recommendations.  Understanding was verbalized, however additional teaching warranted.    Plan:     Will continue to follow and tx as appropriate.    SLP Follow-Up:  Yes   Patient to be seen:      Plan of Care expires:     Plan of Care reviewed with:  patient       Time Tracking:     SLP Treatment Date:   01/04/23  Speech Start Time:  0900  Speech Stop Time:  0945     Speech Total Time (min):  45 min    Billable minutes:  Treatment of Swallow Dysfunction, 30  Self Care/Home Management, 15        01/04/2023

## 2023-01-04 NOTE — PROGRESS NOTES
.UROLOGY  PROGRESS  NOTE    Caryn Lorenzo 1940  [id number]  1/4/2023    Had acute change in condition, HR dropped to 20's, nurse unable to get a temperature and is less responsive. Attending and family made aware of change in condition. He is a DNR.      Intake/Output:  I/O this shift:  In: -   Out: 1050 [Urine:1050]  I/O last 3 completed shifts:  In: 40174 [Other:30396]  Out: 84726 [Urine:54485]       Exam:    Ill appearing   pink urine with clot sediments draining to  bag with min CBI      Recent Results (from the past 24 hour(s))   POCT glucose    Collection Time: 01/03/23  4:49 PM   Result Value Ref Range    POCT Glucose 156 (H) 70 - 110 mg/dL         Assessment:  -COVID with hypoxemic resp failure  -RLE DVT; anticoagulation on hold  -Gross hematuria; CT shows bladder wall thickening and possible cystitis   -BPH on Flomax and Proscar  -LAURA on CKD      Plan:  Can continue to titrate CBI for palliative measures  Will follow      MARION Puentes

## 2023-01-04 NOTE — PROGRESS NOTES
[facility]  Hospital Medicine Progress Note        Chief Complaint: Inpatient Follow-up for covid    HPI:   82-year-old male with significant if chronic kidney IIIb, HTN, hyperuricemia, type 2 diabetes mellitus, anemia of chronic disease, cognitive deficit presented to the ED from nursing home with complaints of shortness of breath.  Patient was recently diagnosed with COVID-19 infection.  Patient was borderline hypotensive, tachypneic, hypothermic in the ED.  Placed on Gerri Hugger.  Oxygen supplementation given and hypoxemia improved.  Lab significant for severe leukocytosis, mildly elevated troponin which later trended up.  Patient also had worsening renal insufficiency compared to baseline.  COVID-19 PCR positive.  Chest x-ray with prominent interstitial markings.  Patient was initiated on broad-spectrum antimicrobials, IV fluids and was admitted to hospitalist medicine service.  TTE showed worsened EF 20-25% and possible LV thrombus.  Cardiology again reviewed the echo determine there is no LV thrombus.  Recommended to have outpatient ischemic workup,Given COVID and requiring supplemental oxygen.  No life vest due to dementia.  Also recommended to avoid diuretics due to abnormal renal indices.      Given persistent hypoxia, obtain CT chest noncontrast which revealed bilateral dependent worsening of infiltrates representing possible aspiration pneumonia    Labs showed elevated inflammatory markers.    Patient developed bradycardia after starting remdesivir yesterday evening.  Remdesivir stopped.      DVT in the right lower extremity noted.  Started on heparin drip. Patient was started on heparin drip for lower extremity DVT given renal dysfunction, patient developed gross hematuria.   Also patient was noted to have multiple episodes loose bowel movements.  Fecal leukocytes negative.  Possibly related to COVID, antibiotics related.       Interval Hx:   Events noted,unable to obtain  oral temp, , heart rate dropping, bp becoming soft. Pt with more agonal breathing. CBI initiated yesterday for comfort. Bag with blood tinged urine.      Objective/physical exam:  General:  Appears uncomfortable    Chest:  decreased breath sounds with crackles and rhonchi anteriorly   Heart:  Regular rate and rhythm  Abdomen: Soft, nontender, BS +  MSK:  Mild Pedal edema   : pérez in place, CBI on going, bloody urine noted   Neurologic:  Alert but not oriented  VITAL SIGNS: 24 HRS MIN & MAX LAST   Temp  Min: 97.9 °F (36.6 °C)  Max: 98 °F (36.7 °C) 97.9 °F (36.6 °C)   BP  Min: 98/61  Max: 150/66 (!) 150/66   Pulse  Min: 52  Max: 73  73   Resp  Min: 19  Max: 22 (!) 22     SpO2  Min: 92 %  Max: 97 % 97 %       Recent Labs   Lab 01/03/23  0447   WBC 8.1   RBC 3.96*   HGB 11.6*   HCT 36.0*   MCV 90.9   MCH 29.3   MCHC 32.2*   RDW 16.0*      MPV 10.6         Recent Labs   Lab 01/02/23  1014 01/03/23  0447   NA  --  148*   K  --  4.3   CO2  --  20*   BUN  --  69.3*   CREATININE  --  1.86*   CALCIUM  --  9.6   PH 7.46*  --    ALBUMIN  --  2.0*   ALKPHOS  --  78   ALT  --  22   AST  --  18   BILITOT  --  0.5          Microbiology Results (last 7 days)       Procedure Component Value Units Date/Time    Urine culture [[id number]] Collected: 01/02/23 1312    Order Status: Completed Specimen: Urine, Clean Catch Updated: 01/04/23 1017     Urine Culture No Growth    Stool Culture [[id number]]  (Abnormal) Collected: 01/01/23 1638    Order Status: Completed Specimen: Stool Updated: 01/03/23 1223     Stool Culture Negative for Salmonella, Shigella, Campylobacter, Vibrio, Aeromonas, Pleisiomonas,Yersinia, or Shiga Toxin 1 and 2.      Moderate Yeast    Blood Culture #2 **CANNOT BE ORDERED STAT** [[id number]]  (Normal) Collected: 12/28/22 1226    Order Status: Completed Specimen: Blood Updated: 01/02/23 1401     CULTURE, BLOOD ([facility]) No Growth at 5 days    Blood Culture #1 **CANNOT BE ORDERED STAT** [[id number]]  (Normal) Collected:  12/28/22 1217    Order Status: Completed Specimen: Blood Updated: 01/02/23 1401     CULTURE, BLOOD ([facility]) No Growth at 5 days    Urine culture [[id number]] Collected: 12/29/22 1436    Order Status: Completed Specimen: Urine Updated: 12/31/22 1121     Urine Culture No Growth             Scheduled Med:   ascorbic acid (vitamin C)  500 mg Oral BID    aspirin  81 mg Oral Daily    atorvastatin  20 mg Oral Daily    dexAMETHasone  6 mg Oral Daily    doxycycline  100 mg Oral Q12H    finasteride  5 mg Oral Daily    metoprolol succinate  25 mg Oral Daily    mirtazapine  15 mg Oral QHS    perflutren lipid microspheres  1.3 mL Intravenous Once    piperacillin-tazobactam (ZOSYN) IVPB  4.5 g Intravenous Q8H    QUEtiapine  25 mg Oral BID    tamsulosin  0.4 mg Oral Daily    vitamin D  5,000 Units Oral Daily          Assessment/Plan:  Severe COVID-19 pneumonitis   Decompensated Heart failure with reduced ejection fraction  Possible aspiration pneumonia based on CT finding  Acute hypoxemic respiratory failure secondary to above   Sirs-sepsis secondary to COVID-19 versus superimposed bacterial infection   Acute right lower extremity DVT  Acute on chronic kidney disease, stage IIIB  NSTEMI-possible type 2  Hypernatremia  Hyperglycemia in the setting of Type 2 diabetes mellitus on glucocorticoids A1c 5.2%  Diarrhea possibly viral, antibiotics related  Essential HTN-stable   Hyperuricemia   Anemia of chronic disease       Palliative team on board.  Appreciate recommendations.    Urology on board.  Recommended to cont CBI for comfort.  Continue supplemental oxygen.   Cont iv abx   Cont peripheral nutrition   Keep NPO. Unsafe to have MBS eval, family agreed not to have MBS study.  Continue  aspiration precautions      Code status : Discussed events with Son Jose Lorenzo. Remains DNI for now. Requested few hrs to discuss with family before making the pt to comfort care only. May qualify for inpatient  hospice but given rapid declining  question he can make out of the  hospital.         DVT prophylaxis-SCDs    Critical care time spent:  40 minutes (discussions with family, consultants)  Critical care diagnosis:   Acute hypoxic respiratory failure, COVID, congestive heart failure    Deangelo Cabello MD   01/04/2023

## 2023-01-04 NOTE — PT/OT/SLP PROGRESS
SLP notified of decline in medical status this afternoon. MBS cancelled and pt is being transferred to inpatient hospice care. Skilled services are no longer warranted at this time. Please re-consult if anything changes. Thank you!

## 2023-01-04 NOTE — PLAN OF CARE
Clinical updates sent to [facility]. Spoke to Percy and he provided me with a few agencies contracted at their facility ([facility], [facility], [facility], and [facility]). Spoke to son Jose via phone about hospice and he informed me that he as not made a decision on hospice. He would like to see how the patient progress in the next day or two before he makes that decision. Will follow up.

## 2023-01-05 VITALS
HEART RATE: 76 BPM | HEIGHT: 73 IN | TEMPERATURE: 96 F | RESPIRATION RATE: 22 BRPM | WEIGHT: 181 LBS | SYSTOLIC BLOOD PRESSURE: 132 MMHG | BODY MASS INDEX: 23.99 KG/M2 | DIASTOLIC BLOOD PRESSURE: 70 MMHG | OXYGEN SATURATION: 99 %

## 2023-01-05 LAB — POCT GLUCOSE: 96 MG/DL (ref 70–110)

## 2023-01-05 PROCEDURE — 25000003 PHARM REV CODE 250: Performed by: INTERNAL MEDICINE

## 2023-01-05 RX ORDER — MORPHINE SULFATE 15 MG/1
15 TABLET ORAL EVERY 6 HOURS PRN
Refills: 0
Start: 2023-01-05

## 2023-01-05 RX ADMIN — MORPHINE SULFATE 15 MG: 15 TABLET ORAL at 04:01

## 2023-01-05 RX ADMIN — MORPHINE SULFATE 15 MG: 15 TABLET ORAL at 01:01

## 2023-01-05 NOTE — DISCHARGE SUMMARY
[facility] - 4th Floor [facility] Medicine  Discharge Summary      Patient Name: Caryn Lorenzo  MRN: [id number]  [facility]: [id number]  Patient Class: IP- Inpatient  Admission Date: 12/28/2022  Hospital Length of Stay: 8 days  Discharge Date and Time:  01/05/2023 4:07 PM  Attending Physician: Jose Mccauley MD   Discharging Provider: Tor Caputo MD  Primary Care Provider: Primary Doctor Sharee    Primary Care Team: Networked reference to record PCT       82-year-old male with significant if chronic kidney IIIb, HTN, hyperuricemia, type 2 diabetes mellitus, anemia of chronic disease, cognitive deficit presented to the ED from nursing home with complaints of shortness of breath.  Patient was recently diagnosed with COVID-19 infection.  Patient was borderline hypotensive, tachypneic, hypothermic in the ED.  Placed on Gerri Hugger.  Oxygen supplementation given and hypoxemia improved.  Lab significant for severe leukocytosis, mildly elevated troponin which later trended up.  Patient also had worsening renal insufficiency compared to baseline.  COVID-19 PCR positive.  Chest x-ray with prominent interstitial markings.  Patient was initiated on broad-spectrum antimicrobials, IV fluids and was admitted to hospitalist medicine service.  TTE showed worsened EF 20-25% and possible LV thrombus.  Cardiology again reviewed the echo determine there is no LV thrombus.  Recommended to have outpatient ischemic workup,Given COVID and requiring supplemental oxygen.  No life vest due to dementia.  Also recommended to avoid diuretics due to abnormal renal indices.       Given persistent hypoxia, obtain CT chest noncontrast which revealed bilateral dependent worsening of infiltrates representing possible aspiration pneumonia     Labs showed elevated inflammatory markers.    Patient developed bradycardia after starting remdesivir yesterday evening.  Remdesivir stopped.       DVT in the right lower  extremity noted.  Started on heparin drip. Patient was started on heparin drip for lower extremity DVT given renal dysfunction, patient developed gross hematuria.   Also patient was noted to have multiple episodes loose bowel movements.  Fecal leukocytes negative.  Possibly related to COVID, antibiotics related.         Interval Hx:   Afebrile.  Remains on oxygen supplementation due to hypoxia.  Hematuria continues.  When seen at bedside he was disoriented, mittens in place.  Unable to obtain any review of systems from him.  No labs were obtained for this morning.        Objective/physical exam:  Vitals          Vitals:     01/04/23 2054 01/05/23 0010 01/05/23 0102 01/05/23 0807   BP: 133/70 136/71   132/68   Pulse: 66 70   81   Resp:     (!) 22     Temp:           TempSrc:           SpO2:       (!) 91%   Weight:           Height:                 General: In no acute distress, afebrile, confused  Respiratory: Clear to auscultation bilaterally  Cardiovascular: S1, S2, no appreciable murmur  Abdomen: Soft, nontender, BS +  : pérez  MSK: Warm, no lower extremity edema, no clubbing or cyanosis  Neurologic: Alert and disoriented x4, moving all extremities spontaneously           Lab Results   Component Value Date      (H) 01/03/2023     K 4.3 01/03/2023     CO2 20 (L) 01/03/2023     BUN 69.3 (H) 01/03/2023     CREATININE 1.86 (H) 01/03/2023     CALCIUM 9.6 01/03/2023     EGFRNONAA 58 02/01/2019            Lab Results   Component Value Date     ALT 22 01/03/2023     AST 18 01/03/2023     ALKPHOS 78 01/03/2023     BILITOT 0.5 01/03/2023            Lab Results   Component Value Date     WBC 8.1 01/03/2023     HGB 11.6 (L) 01/03/2023     HCT 36.0 (L) 01/03/2023     MCV 90.9 01/03/2023      01/03/2023             Medications:   ascorbic acid (vitamin C)  500 mg Oral BID    aspirin  81 mg Oral Daily    atorvastatin  20 mg Oral Daily    dexAMETHasone  6 mg Oral Daily    doxycycline  100 mg Oral Q12H     finasteride  5 mg Oral Daily    metoprolol succinate  25 mg Oral Daily    perflutren lipid microspheres  1.3 mL Intravenous Once    piperacillin-tazobactam (ZOSYN) IVPB  4.5 g Intravenous Q8H    tamsulosin  0.4 mg Oral Daily    vitamin D  5,000 Units Oral Daily      dextrose 10%, dextrose 10%, glucagon (human recombinant), glucose, glucose, guaiFENesin 100 mg/5 ml, hydrALAZINE, insulin aspart U-100, insulin aspart U-100, loperamide, melatonin, morphine, sodium chloride 0.9%      Assessment/Plan:     Severe COVID-19 pneumonitis   Decompensated Heart failure with reduced ejection fraction  Possible aspiration pneumonia based on CT finding  Acute hypoxemic respiratory failure secondary to above   Sirs-sepsis secondary to COVID-19 versus superimposed bacterial infection   Acute right lower extremity DVT  Acute on chronic kidney disease, stage IIIB  NSTEMI-possible type 2  Hypernatremia  Hyperglycemia in the setting of Type 2 diabetes mellitus on glucocorticoids A1c 5.2%  Diarrhea possibly viral, antibiotics related  Essential HTN-stable   Hyperuricemia   Anemia of chronic disease      Plan:  -continue CBI for comfort.  Urology on board.  Continue to hold anticoagulation.      Remains NPO    DNR, hospice  No labs      Goals of Care Treatment Preferences:  Code Status: DNR    Health care agent: diana Garcia [facility] agent number: [phone]          What is most important right now is to focus on avoiding the hospital, symptom/pain control, quality of life, even if it means sacrificing a little time, improvement in condition but with limits to invasive therapies, comfort and QOL .  Accordingly, we have decided that the best plan to meet the patient's goals includes enrolling in hospice care.      Consults:   Consults (From admission, onward)          Status Ordering Provider     Inpatient consult to Social Work/Case Management  Once        Provider:  (Not yet assigned)    Completed ELIDA PHAM      Inpatient consult to Urology  Once        Provider:  Elian Casanova MD    Acknowledged BYRON FLOR     Inpatient consult to Palliative Care  Once        Provider:  Yasir Hoang MD    Completed BYRON FLOR     Inpatient consult to Cardiology  Once        Provider:  Rene Roth Jr., MD    Completed WENDY DUNNE            No new Assessment & Plan notes have been filed under this hospital service since the last note was generated.  Service: Hospital Medicine    Final Active Diagnoses:    Diagnosis Date Noted POA    PRINCIPAL PROBLEM:  Pneumonia due to COVID-19 virus [U07.1, J12.82] 12/28/2022 Yes      Problems Resolved During this Admission:       Discharged Condition: poor    Disposition: Hospice/Home    Follow Up:   Follow-up Information       Bonifacio Sorto MD. Go to.    Specialty: Cardiology  Why: Follow up appointment on Jan 11 at 2:10 pm  Contact information:  [address] [address]  [phone]                           Patient Instructions:   No discharge procedures on file.    Significant Diagnostic Studies: Labs: CMP No results for input(s): NA, K, CL, CO2, GLU, BUN, CREATININE, CALCIUM, PROT, ALBUMIN, BILITOT, ALKPHOS, AST, ALT, ANIONGAP, ESTGFRAFRICA, EGFRNONAA in the last 48 hours.    Pending Diagnostic Studies:       Procedure Component Value Units Date/Time    MRSA PCR [[id number]]     Order Status: Sent Lab Status: No result     Specimen: Nasal Swab     Occult Blood, Stool, Diagnostic (1-3) [[id number]]     Order Status: Sent Lab Status: No result     Specimen: Stool     Narrative:      The following orders were created for panel order Occult Blood, Stool, Diagnostic (1-3).  Procedure                               Abnormality         Status                     ---------                               -----------         ------                     Occult blood x 3, stool[[id number]]                                                        Please view results for these tests on the individual orders.    Occult blood x 3, stool [[id number]]     Order Status: Sent Lab Status: No result     Specimen: Stool            Medications:  Reconciled Home Medications:      Medication List        START taking these medications      morphine 15 MG tablet  Commonly known as: MSIR  Take 1 tablet (15 mg total) by mouth every 6 (six) hours as needed for Pain.            STOP taking these medications      amLODIPine 2.5 MG tablet  Commonly known as: NORVASC     benazepriL 10 MG tablet  Commonly known as: LOTENSIN     dexAMETHasone 6 MG tablet  Commonly known as: DECADRON     finasteride 5 mg tablet  Commonly known as: PROSCAR     furosemide 40 MG tablet  Commonly known as: LASIX     mirtazapine 15 MG tablet  Commonly known as: REMERON     pioglitazone 15 MG tablet  Commonly known as: ACTOS     potassium chloride 10 MEQ Cpsr  Commonly known as: MICRO-K     QUEtiapine 25 MG Tab  Commonly known as: SEROQUEL     tamsulosin 0.4 mg Cap  Commonly known as: FLOMAX     VITAMIN C 500 MG tablet  Generic drug: ascorbic acid (vitamin C)     vitamin D 1000 units Tab  Commonly known as: VITAMIN D3     zinc sulfate 50 mg zinc (220 mg) capsule  Commonly known as: ZINCATE              Indwelling Lines/Drains at time of discharge:   Lines/Drains/Airways       Drain  Duration                  Urethral Catheter 01/03/23 1200 Triple-lumen 24 Fr. 2 days                    Time spent on the discharge of patient: 35 minutes         Tor Caputo MD  Department of Hospital Medicine  [facility] - 4th Floor Medical Telemetry

## 2023-01-05 NOTE — PROGRESS NOTES
[facility]  Hospital Medicine Progress Note        Chief Complaint: Inpatient Follow-up for     HPI:   82-year-old male with significant if chronic kidney IIIb, HTN, hyperuricemia, type 2 diabetes mellitus, anemia of chronic disease, cognitive deficit presented to the ED from nursing home with complaints of shortness of breath.  Patient was recently diagnosed with COVID-19 infection.  Patient was borderline hypotensive, tachypneic, hypothermic in the ED.  Placed on Gerri Hugger.  Oxygen supplementation given and hypoxemia improved.  Lab significant for severe leukocytosis, mildly elevated troponin which later trended up.  Patient also had worsening renal insufficiency compared to baseline.  COVID-19 PCR positive.  Chest x-ray with prominent interstitial markings.  Patient was initiated on broad-spectrum antimicrobials, IV fluids and was admitted to hospitalist medicine service.  TTE showed worsened EF 20-25% and possible LV thrombus.  Cardiology again reviewed the echo determine there is no LV thrombus.  Recommended to have outpatient ischemic workup,Given COVID and requiring supplemental oxygen.  No life vest due to dementia.  Also recommended to avoid diuretics due to abnormal renal indices.       Given persistent hypoxia, obtain CT chest noncontrast which revealed bilateral dependent worsening of infiltrates representing possible aspiration pneumonia     Labs showed elevated inflammatory markers.    Patient developed bradycardia after starting remdesivir yesterday evening.  Remdesivir stopped.       DVT in the right lower extremity noted.  Started on heparin drip. Patient was started on heparin drip for lower extremity DVT given renal dysfunction, patient developed gross hematuria.   Also patient was noted to have multiple episodes loose bowel movements.  Fecal leukocytes negative.  Possibly related to COVID, antibiotics related.       Interval Hx:   Afebrile.  Remains on oxygen  supplementation due to hypoxia.  Hematuria continues.  When seen at bedside he was disoriented, mittens in place.  Unable to obtain any review of systems from him.  No labs were obtained for this morning.      Objective/physical exam:  Vitals:    01/04/23 2054 01/05/23 0010 01/05/23 0102 01/05/23 0807   BP: 133/70 136/71  132/68   Pulse: 66 70  81   Resp:   (!) 22    Temp:       TempSrc:       SpO2:    (!) 91%   Weight:       Height:         General: In no acute distress, afebrile, confused  Respiratory: Clear to auscultation bilaterally  Cardiovascular: S1, S2, no appreciable murmur  Abdomen: Soft, nontender, BS +  : pérez  MSK: Warm, no lower extremity edema, no clubbing or cyanosis  Neurologic: Alert and disoriented x4, moving all extremities spontaneously    Lab Results   Component Value Date     (H) 01/03/2023    K 4.3 01/03/2023    CO2 20 (L) 01/03/2023    BUN 69.3 (H) 01/03/2023    CREATININE 1.86 (H) 01/03/2023    CALCIUM 9.6 01/03/2023    EGFRNONAA 58 02/01/2019      Lab Results   Component Value Date    ALT 22 01/03/2023    AST 18 01/03/2023    ALKPHOS 78 01/03/2023    BILITOT 0.5 01/03/2023      Lab Results   Component Value Date    WBC 8.1 01/03/2023    HGB 11.6 (L) 01/03/2023    HCT 36.0 (L) 01/03/2023    MCV 90.9 01/03/2023     01/03/2023           Medications:   ascorbic acid (vitamin C)  500 mg Oral BID    aspirin  81 mg Oral Daily    atorvastatin  20 mg Oral Daily    dexAMETHasone  6 mg Oral Daily    doxycycline  100 mg Oral Q12H    finasteride  5 mg Oral Daily    metoprolol succinate  25 mg Oral Daily    perflutren lipid microspheres  1.3 mL Intravenous Once    piperacillin-tazobactam (ZOSYN) IVPB  4.5 g Intravenous Q8H    tamsulosin  0.4 mg Oral Daily    vitamin D  5,000 Units Oral Daily      dextrose 10%, dextrose 10%, glucagon (human recombinant), glucose, glucose, guaiFENesin 100 mg/5 ml, hydrALAZINE, insulin aspart U-100, insulin aspart U-100, loperamide, melatonin, morphine,  sodium chloride 0.9%     Assessment/Plan:    Severe COVID-19 pneumonitis   Decompensated Heart failure with reduced ejection fraction  Possible aspiration pneumonia based on CT finding  Acute hypoxemic respiratory failure secondary to above   Sirs-sepsis secondary to COVID-19 versus superimposed bacterial infection   Acute right lower extremity DVT  Acute on chronic kidney disease, stage IIIB  NSTEMI-possible type 2  Hypernatremia  Hyperglycemia in the setting of Type 2 diabetes mellitus on glucocorticoids A1c 5.2%  Diarrhea possibly viral, antibiotics related  Essential HTN-stable   Hyperuricemia   Anemia of chronic disease     Plan:  -continue goals of care discussions.  Palliative team following.  Appreciate assistance   -continue CBI for comfort.  Urology on board.  Continue to hold anticoagulation.  Continue Flomax and Proscar.  -speech following.  Remains NPO    DNR, awaiting hospice  No labs              Tor Caputo MD

## 2023-01-05 NOTE — PROGRESS NOTES
.UROLOGY  PROGRESS  NOTE    Caryn Lorenzo 1940  [id number]  1/5/2023    Unfortunately, he continues to decline, son notified    Intake/Output:  I/O this shift:  In: 3000 [Other:3000]  Out: -   I/O last 3 completed shifts:  In: 97724 [Other:90446]  Out: 21145 [Urine:21145]       Exam:    Obtunded, not following commands  Resp labored, on supplement O2 via face mask   dark blood tinged urine draining to  bag with minimal CBI      Recent Results (from the past 24 hour(s))   POCT glucose    Collection Time: 01/05/23 11:24 AM   Result Value Ref Range    POCT Glucose 96 70 - 110 mg/dL         Assessment:  -COVID with hypoxemic resp failure  -RLE DVT; anticoagulation on hold  -Gross hematuria; CT shows bladder wall thickening and possible cystitis   -BPH on Flomax and Proscar  -LAURA on CKD      Plan:  Can d/c CBI and Pelletier per nursing staff discretion, if he goes to [facility], can plug irrigation port and just send him with the Pelletier    MARION Puentes

## 2023-01-05 NOTE — PLAN OF CARE
01/05/23 1606   Final Note   Assessment Type Final Discharge Note   Anticipated Discharge Disposition [facility])   Post-Acute Status   Post-Acute Authorization Hospice;Placement   Post-Acute Placement Status Set-up Complete/Auth obtained   Hospice Status Set-up Complete/Auth obtained

## 2023-01-05 NOTE — NURSING
Placed a call to patient to inform of declining health status. Son reported that he was on his way to the hospital.

## 2023-04-10 PROBLEM — J12.82 PNEUMONIA DUE TO COVID-19 VIRUS: Status: RESOLVED | Noted: 2022-12-28 | Resolved: 2023-04-10

## 2023-04-10 PROBLEM — U07.1 PNEUMONIA DUE TO COVID-19 VIRUS: Status: RESOLVED | Noted: 2022-12-28 | Resolved: 2023-04-10
